# Patient Record
Sex: FEMALE | Race: WHITE | NOT HISPANIC OR LATINO | Employment: UNEMPLOYED | URBAN - METROPOLITAN AREA
[De-identification: names, ages, dates, MRNs, and addresses within clinical notes are randomized per-mention and may not be internally consistent; named-entity substitution may affect disease eponyms.]

---

## 2020-10-27 ENCOUNTER — OFFICE VISIT (OUTPATIENT)
Dept: FAMILY MEDICINE | Facility: CLINIC | Age: 50
End: 2020-10-27
Payer: COMMERCIAL

## 2020-10-27 VITALS
HEIGHT: 62 IN | SYSTOLIC BLOOD PRESSURE: 136 MMHG | RESPIRATION RATE: 18 BRPM | TEMPERATURE: 98 F | WEIGHT: 180 LBS | BODY MASS INDEX: 33.13 KG/M2 | OXYGEN SATURATION: 95 % | DIASTOLIC BLOOD PRESSURE: 84 MMHG | HEART RATE: 74 BPM

## 2020-10-27 DIAGNOSIS — M54.2 CHRONIC NECK PAIN: ICD-10-CM

## 2020-10-27 DIAGNOSIS — K21.9 GASTROESOPHAGEAL REFLUX DISEASE, UNSPECIFIED WHETHER ESOPHAGITIS PRESENT: ICD-10-CM

## 2020-10-27 DIAGNOSIS — K31.84 GASTROPARESIS: ICD-10-CM

## 2020-10-27 DIAGNOSIS — M54.12 CERVICAL RADICULOPATHY: ICD-10-CM

## 2020-10-27 DIAGNOSIS — R73.03 PREDIABETES: ICD-10-CM

## 2020-10-27 DIAGNOSIS — Z00.00 PREVENTATIVE HEALTH CARE: ICD-10-CM

## 2020-10-27 DIAGNOSIS — E78.2 MIXED HYPERLIPIDEMIA: ICD-10-CM

## 2020-10-27 DIAGNOSIS — G89.29 CHRONIC NECK PAIN: ICD-10-CM

## 2020-10-27 DIAGNOSIS — R03.0 ELEVATED BP WITHOUT DIAGNOSIS OF HYPERTENSION: ICD-10-CM

## 2020-10-27 DIAGNOSIS — M54.32 SCIATICA OF LEFT SIDE: ICD-10-CM

## 2020-10-27 DIAGNOSIS — G89.29 CHRONIC BILATERAL LOW BACK PAIN WITH LEFT-SIDED SCIATICA: ICD-10-CM

## 2020-10-27 DIAGNOSIS — E03.8 HYPOTHYROIDISM DUE TO HASHIMOTO'S THYROIDITIS: Primary | ICD-10-CM

## 2020-10-27 DIAGNOSIS — M54.42 CHRONIC BILATERAL LOW BACK PAIN WITH LEFT-SIDED SCIATICA: ICD-10-CM

## 2020-10-27 DIAGNOSIS — E06.3 HYPOTHYROIDISM DUE TO HASHIMOTO'S THYROIDITIS: Primary | ICD-10-CM

## 2020-10-27 PROCEDURE — 99999 PR PBB SHADOW E&M-NEW PATIENT-LVL V: CPT | Mod: PBBFAC,,, | Performed by: INTERNAL MEDICINE

## 2020-10-27 PROCEDURE — 99204 OFFICE O/P NEW MOD 45 MIN: CPT | Mod: 25,S$GLB,, | Performed by: INTERNAL MEDICINE

## 2020-10-27 PROCEDURE — 3008F PR BODY MASS INDEX (BMI) DOCUMENTED: ICD-10-PCS | Mod: CPTII,S$GLB,, | Performed by: INTERNAL MEDICINE

## 2020-10-27 PROCEDURE — 90686 FLU VACCINE (QUAD) GREATER THAN OR EQUAL TO 3YO PRESERVATIVE FREE IM: ICD-10-PCS | Mod: S$GLB,,, | Performed by: INTERNAL MEDICINE

## 2020-10-27 PROCEDURE — 99999 PR PBB SHADOW E&M-NEW PATIENT-LVL V: ICD-10-PCS | Mod: PBBFAC,,, | Performed by: INTERNAL MEDICINE

## 2020-10-27 PROCEDURE — 3008F BODY MASS INDEX DOCD: CPT | Mod: CPTII,S$GLB,, | Performed by: INTERNAL MEDICINE

## 2020-10-27 PROCEDURE — 99204 PR OFFICE/OUTPT VISIT, NEW, LEVL IV, 45-59 MIN: ICD-10-PCS | Mod: 25,S$GLB,, | Performed by: INTERNAL MEDICINE

## 2020-10-27 PROCEDURE — 90471 FLU VACCINE (QUAD) GREATER THAN OR EQUAL TO 3YO PRESERVATIVE FREE IM: ICD-10-PCS | Mod: S$GLB,,, | Performed by: INTERNAL MEDICINE

## 2020-10-27 PROCEDURE — 90686 IIV4 VACC NO PRSV 0.5 ML IM: CPT | Mod: S$GLB,,, | Performed by: INTERNAL MEDICINE

## 2020-10-27 PROCEDURE — 90471 IMMUNIZATION ADMIN: CPT | Mod: S$GLB,,, | Performed by: INTERNAL MEDICINE

## 2020-10-27 RX ORDER — ROSUVASTATIN CALCIUM 10 MG/1
TABLET, COATED ORAL
COMMUNITY
End: 2020-11-30 | Stop reason: SDUPTHER

## 2020-10-27 RX ORDER — PANTOPRAZOLE SODIUM 40 MG/1
TABLET, DELAYED RELEASE ORAL
COMMUNITY

## 2020-10-27 RX ORDER — LEVOTHYROXINE SODIUM 88 UG/1
TABLET ORAL
COMMUNITY

## 2020-10-27 RX ORDER — GABAPENTIN 300 MG/1
CAPSULE ORAL
COMMUNITY

## 2020-10-27 RX ORDER — BETHANECHOL CHLORIDE 25 MG/1
TABLET ORAL
COMMUNITY

## 2020-10-27 RX ORDER — CYCLOBENZAPRINE HCL 5 MG
TABLET ORAL
COMMUNITY
End: 2020-11-02 | Stop reason: SDUPTHER

## 2020-10-27 NOTE — PROGRESS NOTES
Assessment and Plan:    1. Hypothyroidism due to Hashimoto's thyroiditis  - TSH; Future  - Ambulatory referral/consult to Endocrinology; Future  2. Prediabetes  - Hemoglobin A1C; Future  - Ambulatory referral/consult to Endocrinology; Future  Discussed options with patient of me monitoring hypothyroidism and prediabetes, states she would prefer to establish with an Endocrinologist. Not on metformin 2/2 gastroparesis. Stable dose of levothyroxine for years per her report.    3. Mixed hyperlipidemia  Continue crestor  - Lipid Panel; Future    4. Elevated BP without diagnosis of hypertension  No prior diagnosis of HTN. Advised to monitor at home and keep BP log.  - Comprehensive Metabolic Panel; Future    5. Chronic neck pain  - Ambulatory referral/consult to Back [facility] Spine Clinic; Future  6. Cervical radiculopathy  - Ambulatory referral/consult to Back [facility] Spine Clinic; Future  7. Chronic bilateral low back pain with left-sided sciatica  - Ambulatory referral/consult to Back & Spine Clinic; Future  8. Sciatica of left side  Has been seeing Neurosurgeon regularly in [place] and has had multiple cervical spinal fusions. Symptoms are not new. Will establish care here for ongoing evaluation and management.    9. Gastroesophageal reflux disease, unspecified whether esophagitis present  Continue protonix. Establish care with GI as below.   - Ambulatory referral/consult to Gastroenterology; Future    10. Gastroparesis  States she has been using bethanechol for gastroparesis per he GI doctor and that this has been working. Will establish with GI here.  - Ambulatory referral/consult to Gastroenterology; Future    11. Preventative health care  [facility] for mammogram [facility]  Flu shot given today  Tdap ordered, but clinic out of stock  - Comprehensive Metabolic Panel; Future  - CBC auto differential; Future  - Lipid Panel; Future  - TSH; Future  - Hemoglobin A1C; Future  - HIV 1/2 Ag/Ab (4th Gen); Future  - Hepatitis C  Antibody; Future  - Tdap Vaccine      [facility] for mammogram [facility] [place]    ______________________________________________________________________  Subjective:    Chief Complaint:  Establish care    HPI:  Dulce is a 50 y.o. year old woman here to establish care    Hypothyroidism- Takes 88 mcg daily, takes this correctly.     She has chronic neck pain. She had a history of C2-3-4 fusion. Had a repeat surgery in March of this year, thinks that she had C4-T1 fused. Takes gabapentin 300 mg TID for numbness and pain in her left hand. Notes that the numbness had been present even before the surgery. She also takes flexeril PRN, usually taking this once per day as needed. She reports that she has also been having pain in her low back for more than a year and had been planning to have this evaluated in [place] before she moved here. Having some left sciatica as well now.     She has a history of gastroparesis. She had been prescribed bethanechol which she takes PRN before a heavy meal per her gastroenterologist. She also takes protonix for GERD per GI.     She has had injections for hip bursitis in the past.     She takes crestor 10 for HLD. Has been on this for about a year.     She has been told intermittently that her BP was high, but has not ever been on medication for this.     Past Medical History:  Past Medical History:   Diagnosis Date    Cervical radiculopathy     Chronic low back pain     Chronic neck pain     Gastroparesis     GERD (gastroesophageal reflux disease)     Hip bursitis     History of gestational diabetes     HLD (hyperlipidemia)     Hypothyroidism     Sciatica of left side        Past Surgical History:  Past Surgical History:   Procedure Laterality Date    CERVICAL FUSION      x2     SECTION      HYSTERECTOMY      SALPINGECTOMY      SHOULDER SURGERY Right        Family History:  Family History   Problem Relation Age of Onset    Diabetes Mother     Hypertension  Mother     Diabetes Father     Hypertension Father        Social History:  Social History     Socioeconomic History    Marital status:      Spouse name: Not on file    Number of children: Not on file    Years of education: Not on file    Highest education level: Not on file   Occupational History    Not on file   Social Needs    Financial resource strain: Not on file    Food insecurity     Worry: Not on file     Inability: Not on file    Transportation needs     Medical: Not on file     Non-medical: Not on file   Tobacco Use    Smoking status: Former Smoker     Types: Cigarettes    Smokeless tobacco: Never Used    Tobacco comment: Quit in 2009   Substance and Sexual Activity    Alcohol use: Yes     Frequency: Monthly or less     Drinks per session: 1 or 2     Binge frequency: Never    Drug use: Never    Sexual activity: Yes     Partners: Male     Birth control/protection: See Surgical Hx   Lifestyle    Physical activity     Days per week: Not on file     Minutes per session: Not on file    Stress: Not on file   Relationships    Social connections     Talks on phone: Not on file     Gets together: Not on file     Attends Confucianist service: Not on file     Active member of club or organization: Not on file     Attends meetings of clubs or organizations: Not on file     Relationship status: Not on file   Other Topics Concern    Not on file   Social History Narrative    Not currently working, recently moved from [place]       Medications:  Current Outpatient Medications on File Prior to Visit   Medication Sig Dispense Refill    bethanechol (URECHOLINE) 25 MG Tab bethanechol chloride 25 mg tablet   Take 1 tablet 4 times a day by oral route.      cyclobenzaprine (FLEXERIL) 5 MG tablet cyclobenzaprine 5 mg tablet   1 po prn      gabapentin (NEURONTIN) 300 MG capsule gabapentin 300 mg capsule   Take 1 capsule 3 times a day by oral route.      levothyroxine (SYNTHROID) 88 MCG tablet levothyroxine  "88 mcg tablet   1 TAB PO QAM on empty stomach   No food/drink/med x 1 hour; no vitamins x 4 hours.      pantoprazole (PROTONIX) 40 MG tablet pantoprazole 40 mg tablet,delayed release   Take 1 tablet every day by oral route.      rosuvastatin (CRESTOR) 10 MG tablet rosuvastatin 10 mg tablet   TAKE 1 TABLET BY MOUTH EVERY DAY       No current facility-administered medications on file prior to visit.        Allergies:  Clarithromycin and Penicillins    Immunizations:  Immunization History   Administered Date(s) Administered    Influenza - Quadrivalent 11/01/2018, 10/01/2019    Influenza - Quadrivalent - PF *Preferred* (6 months and older) 10/27/2020       Review of Systems:  Review of Systems   Constitutional: Negative for chills and fever.   HENT: Negative for congestion and trouble swallowing.    Eyes: Negative for pain and visual disturbance.   Respiratory: Negative for shortness of breath and wheezing.    Cardiovascular: Negative for chest pain and leg swelling.   Gastrointestinal: Negative for abdominal pain, constipation and diarrhea.   Endocrine: Negative for cold intolerance and heat intolerance.   Genitourinary: Negative for difficulty urinating and hematuria.   Musculoskeletal: Positive for back pain, neck pain and neck stiffness. Negative for gait problem and myalgias.   Skin: Negative for rash and wound.   Allergic/Immunologic: Negative for environmental allergies and food allergies.   Neurological: Positive for weakness and numbness. Negative for seizures and syncope.   Psychiatric/Behavioral: Negative for dysphoric mood. The patient is not nervous/anxious.        Objective:    Vitals:  Vitals:    10/27/20 1124 10/27/20 1207   BP: (!) 148/88 136/84   Pulse: 74    Resp: 18    Temp: 98.1 °F (36.7 °C)    TempSrc: Temporal    SpO2: 95%    Weight: 81.7 kg (180 lb 0.1 oz)    Height: 5' 2" (1.575 m)    PainSc:   4    PainLoc: Hand        Physical Exam  Vitals signs reviewed.   Constitutional:       General: " She is not in acute distress.     Appearance: She is well-developed. She is not diaphoretic.   HENT:      Mouth/Throat:      Pharynx: No oropharyngeal exudate.   Eyes:      General: No scleral icterus.        Right eye: No discharge.         Left eye: No discharge.      Conjunctiva/sclera: Conjunctivae normal.   Neck:      Musculoskeletal: Neck supple.      Thyroid: No thyromegaly.      Trachea: No tracheal deviation.      Comments: limited ROM in all directions  Cardiovascular:      Rate and Rhythm: Normal rate and regular rhythm.      Heart sounds: Normal heart sounds. No murmur.   Pulmonary:      Effort: Pulmonary effort is normal. No respiratory distress.      Breath sounds: Normal breath sounds. No wheezing or rales.   Abdominal:      General: Bowel sounds are normal. There is no distension.      Palpations: Abdomen is soft.      Tenderness: There is no abdominal tenderness.      Hernia: No hernia is present.   Musculoskeletal:      Right lower leg: No edema.      Left lower leg: No edema.   Lymphadenopathy:      Cervical: No cervical adenopathy.   Skin:     General: Skin is warm and dry.   Neurological:      Mental Status: She is alert and oriented to person, place, and time.   Psychiatric:         Behavior: Behavior normal.         Judgment: Judgment normal.         Body mass index is 32.92 kg/m².      Data:  Previous outside labs reviewed and pertinent for A1c 6.3.        Delia Nascimento MD  Internal Medicine

## 2020-10-28 DIAGNOSIS — Z12.31 OTHER SCREENING MAMMOGRAM: ICD-10-CM

## 2020-11-02 ENCOUNTER — OFFICE VISIT (OUTPATIENT)
Dept: SPINE | Facility: CLINIC | Age: 50
End: 2020-11-02
Payer: COMMERCIAL

## 2020-11-02 VITALS
WEIGHT: 180 LBS | DIASTOLIC BLOOD PRESSURE: 90 MMHG | HEART RATE: 81 BPM | SYSTOLIC BLOOD PRESSURE: 148 MMHG | HEIGHT: 62 IN | BODY MASS INDEX: 33.13 KG/M2

## 2020-11-02 DIAGNOSIS — M54.12 CERVICAL RADICULOPATHY: ICD-10-CM

## 2020-11-02 DIAGNOSIS — M54.42 CHRONIC BILATERAL LOW BACK PAIN WITH LEFT-SIDED SCIATICA: ICD-10-CM

## 2020-11-02 DIAGNOSIS — G89.29 CHRONIC NECK PAIN: ICD-10-CM

## 2020-11-02 DIAGNOSIS — G89.29 CHRONIC BILATERAL LOW BACK PAIN WITH LEFT-SIDED SCIATICA: ICD-10-CM

## 2020-11-02 DIAGNOSIS — M54.2 CHRONIC NECK PAIN: ICD-10-CM

## 2020-11-02 DIAGNOSIS — Z98.1 HISTORY OF FUSION OF CERVICAL SPINE: Primary | ICD-10-CM

## 2020-11-02 PROCEDURE — 99203 OFFICE O/P NEW LOW 30 MIN: CPT | Mod: S$GLB,,, | Performed by: PHYSICIAN ASSISTANT

## 2020-11-02 PROCEDURE — 99203 PR OFFICE/OUTPT VISIT, NEW, LEVL III, 30-44 MIN: ICD-10-PCS | Mod: S$GLB,,, | Performed by: PHYSICIAN ASSISTANT

## 2020-11-02 PROCEDURE — 99999 PR PBB SHADOW E&M-EST. PATIENT-LVL V: ICD-10-PCS | Mod: PBBFAC,,, | Performed by: PHYSICIAN ASSISTANT

## 2020-11-02 PROCEDURE — 99999 PR PBB SHADOW E&M-EST. PATIENT-LVL V: CPT | Mod: PBBFAC,,, | Performed by: PHYSICIAN ASSISTANT

## 2020-11-02 RX ORDER — DIAZEPAM 10 MG/1
10 TABLET ORAL ONCE
Qty: 1 TABLET | Refills: 0 | Status: SHIPPED | OUTPATIENT
Start: 2020-11-02 | End: 2020-11-23 | Stop reason: SDUPTHER

## 2020-11-02 RX ORDER — CYCLOBENZAPRINE HCL 5 MG
5 TABLET ORAL EVERY 12 HOURS PRN
Qty: 40 TABLET | Refills: 0 | Status: SHIPPED | OUTPATIENT
Start: 2020-11-02 | End: 2021-03-24 | Stop reason: SDUPTHER

## 2020-11-02 NOTE — PROGRESS NOTES
Back and Spine Consult    Patient ID: Dulce Mobley is a 50 y.o. female.    Chief Complaint   Patient presents with    Neck Pain     She has chronic neck pain. She had neck surgery 3/2020 in [place], [place]. She has numbness & pain in her left hand, weakness causes her to drop things.    Low-back Pain     She has had chronic low back pain & pain radiates down the left leg to the left shin. Pain started 8-9 months ago. Sitting or walking to long makes pain worse.       Review of Systems   Constitutional: Negative for activity change, appetite change, chills, fever and unexpected weight change.   HENT: Negative for tinnitus, trouble swallowing and voice change.    Respiratory: Negative for apnea, cough, chest tightness and shortness of breath.    Cardiovascular: Negative for chest pain and palpitations.   Gastrointestinal: Negative for constipation, diarrhea, nausea and vomiting.   Genitourinary: Negative for difficulty urinating, dysuria, frequency and urgency.   Musculoskeletal: Positive for back pain, myalgias and neck pain. Negative for gait problem and neck stiffness.   Skin: Negative for wound.   Neurological: Positive for weakness and numbness. Negative for dizziness, tremors, seizures, facial asymmetry, speech difficulty, light-headedness and headaches.   Psychiatric/Behavioral: Negative for confusion and decreased concentration.       Past Medical History:   Diagnosis Date    Cervical radiculopathy     Chronic low back pain     Chronic neck pain     Gastroparesis     GERD (gastroesophageal reflux disease)     Hip bursitis     History of gestational diabetes     HLD (hyperlipidemia)     Hypothyroidism     Sciatica of left side      Social History     Socioeconomic History    Marital status:      Spouse name: Not on file    Number of children: Not on file    Years of education: Not on file    Highest education level: Not on file   Occupational History    Not on file   Social Needs     Financial resource strain: Not on file    Food insecurity     Worry: Not on file     Inability: Not on file    Transportation needs     Medical: Not on file     Non-medical: Not on file   Tobacco Use    Smoking status: Former Smoker     Types: Cigarettes    Smokeless tobacco: Never Used    Tobacco comment: Quit in 2009   Substance and Sexual Activity    Alcohol use: Yes     Frequency: Monthly or less     Drinks per session: 1 or 2     Binge frequency: Never    Drug use: Never    Sexual activity: Yes     Partners: Male     Birth control/protection: See Surgical Hx   Lifestyle    Physical activity     Days per week: Not on file     Minutes per session: Not on file    Stress: Not on file   Relationships    Social connections     Talks on phone: Not on file     Gets together: Not on file     Attends Advent service: Not on file     Active member of club or organization: Not on file     Attends meetings of clubs or organizations: Not on file     Relationship status: Not on file   Other Topics Concern    Not on file   Social History Narrative    Not currently working, recently moved from [place]     Family History   Problem Relation Age of Onset    Diabetes Mother     Hypertension Mother     Diabetes Father     Hypertension Father      Review of patient's allergies indicates:   Allergen Reactions    Clarithromycin Rash    Penicillins Rash       Current Outpatient Medications:     bethanechol (URECHOLINE) 25 MG Tab, bethanechol chloride 25 mg tablet  Take 1 tablet 4 times a day by oral route., Disp: , Rfl:     cyclobenzaprine (FLEXERIL) 5 MG tablet, Take 1 tablet (5 mg total) by mouth every 12 (twelve) hours as needed for Muscle spasms., Disp: 40 tablet, Rfl: 0    diazePAM (VALIUM) 10 MG Tab, Take 1 tablet (10 mg total) by mouth once. 30 minutes prior to MRI. for 1 dose, Disp: 1 tablet, Rfl: 0    gabapentin (NEURONTIN) 300 MG capsule, gabapentin 300 mg capsule  Take 1 capsule 3 times a day by oral  "route., Disp: , Rfl:     levothyroxine (SYNTHROID) 88 MCG tablet, levothyroxine 88 mcg tablet  1 TAB PO QAM on empty stomach  No food/drink/med x 1 hour; no vitamins x 4 hours., Disp: , Rfl:     pantoprazole (PROTONIX) 40 MG tablet, pantoprazole 40 mg tablet,delayed release  Take 1 tablet every day by oral route., Disp: , Rfl:     rosuvastatin (CRESTOR) 10 MG tablet, rosuvastatin 10 mg tablet  TAKE 1 TABLET BY MOUTH EVERY DAY, Disp: , Rfl:     Vitals:    11/02/20 1324   BP: (!) 148/90   BP Location: Right arm   Patient Position: Sitting   BP Method: Medium (Automatic)   Pulse: 81   Weight: 81.7 kg (180 lb 0.1 oz)   Height: 5' 2" (1.575 m)       Physical Exam  Vitals signs and nursing note reviewed.   Constitutional:       Appearance: She is well-developed.   HENT:      Head: Normocephalic and atraumatic.   Eyes:      Pupils: Pupils are equal, round, and reactive to light.   Neck:      Musculoskeletal: Normal range of motion and neck supple.   Cardiovascular:      Rate and Rhythm: Normal rate.   Pulmonary:      Effort: Pulmonary effort is normal.   Musculoskeletal: Normal range of motion.   Skin:     General: Skin is warm and dry.   Neurological:      Mental Status: She is alert and oriented to person, place, and time.      Coordination: Finger-Nose-Finger Test, Heel to Shin Test and Romberg Test normal.      Gait: Gait is intact. Tandem walk normal.      Deep Tendon Reflexes:      Reflex Scores:       Tricep reflexes are 2+ on the right side and 2+ on the left side.       Bicep reflexes are 2+ on the right side and 2+ on the left side.       Brachioradialis reflexes are 2+ on the right side and 2+ on the left side.       Patellar reflexes are 2+ on the right side and 2+ on the left side.       Achilles reflexes are 2+ on the right side and 2+ on the left side.  Psychiatric:         Speech: Speech normal.         Behavior: Behavior normal.         Thought Content: Thought content normal.         Judgment: Judgment " normal.         Neurologic Exam     Mental Status   Oriented to person, place, and time.   Oriented to person.   Oriented to place.   Oriented to time.   Follows 3 step commands.   Attention: normal. Concentration: normal.   Speech: speech is normal   Level of consciousness: alert  Knowledge: consistent with education.   Able to name object. Able to read. Able to repeat. Able to write. Normal comprehension.     Cranial Nerves     CN II   Visual acuity: normal  Right visual field deficit: none  Left visual field deficit: none     CN III, IV, VI   Pupils are equal, round, and reactive to light.  Right pupil: Size: 3 mm. Shape: regular. Reactivity: brisk. Consensual response: intact.   Left pupil: Size: 3 mm. Shape: regular. Reactivity: brisk. Consensual response: intact.   CN III: no CN III palsy  CN VI: no CN VI palsy  Nystagmus: none   Diplopia: none  Ophthalmoparesis: none  Conjugate gaze: present    CN V   Right facial sensation deficit: none  Left facial sensation deficit: none    CN VII   Right facial weakness: none  Left facial weakness: none    CN VIII   Hearing: intact    CN IX, X   CN IX normal.   CN X normal.     CN XI   Right sternocleidomastoid strength: normal  Left sternocleidomastoid strength: normal  Right trapezius strength: normal  Left trapezius strength: normal    CN XII   Fasciculations: absent  Tongue deviation: none    Motor Exam   Muscle bulk: normal  Overall muscle tone: normal  Right arm pronator drift: absent  Left arm pronator drift: absent    Strength   Right neck flexion: 5/5  Left neck flexion: 5/5  Right neck extension: 5/5  Left neck extension: 5/5  Right deltoid: 5/5  Left deltoid: 5/5  Right biceps: 5/5  Left biceps: 5/5  Right triceps: 5/5  Left triceps: 5/5  Right wrist flexion: 5/5  Left wrist flexion: 5/5  Right wrist extension: 5/5  Left wrist extension: 5/5  Right interossei: 5/5  Left interossei: 5/5  Right abdominals: 5/5  Left abdominals: 5/5  Right iliopsoas: 5/5  Left  iliopsoas: 5/5  Right quadriceps: 5/5  Left quadriceps: 5/5  Right hamstrin/5  Left hamstrin/5  Right glutei: 5/5  Left glutei: 5/5  Right anterior tibial: 5/5  Left anterior tibial: 5/5  Right posterior tibial: 5/5  Left posterior tibial: 5/5  Right peroneal: 5/5  Left peroneal: 5/5  Right gastroc: 5/5  Left gastroc: 5/5    Sensory Exam   Right arm light touch: normal  Left arm light touch: normal  Right leg light touch: normal  Left leg light touch: normal  Right arm vibration: normal  Left arm vibration: normal  Right arm pinprick: normal  Left arm pinprick: normal    Gait, Coordination, and Reflexes     Gait  Gait: normal    Coordination   Romberg: negative  Finger to nose coordination: normal  Heel to shin coordination: normal  Tandem walking coordination: normal    Tremor   Resting tremor: absent  Intention tremor: absent  Action tremor: absent    Reflexes   Right brachioradialis: 2+  Left brachioradialis: 2+  Right biceps: 2+  Left biceps: 2+  Right triceps: 2+  Left triceps: 2+  Right patellar: 2+  Left patellar: 2+  Right achilles: 2+  Left achilles: 2+  Right Boggs: absent  Left Boggs: absent  Right ankle clonus: absent  Left ankle clonus: absent      Provider dictation:    50 year old female former smoker with chronic pain, GERD and hyperlipidemia is referred by Dr. Nascimento for evaluation of neck and back pain.      She has had 2 cervical spine surgeries - 2007 and 2020 ([facility]).  She underwent 2 months of PT after her last surgery, but continued to have pain.  She has pain in the posterior neck.  It is associated with numbness/ pain from the left elbow to the hand affecting the 4th and 5th digits, but now also the 3rd digit.  Pain increases with touching the hand.      She has had lower back pain for 8-9 months.  It is felt in the left side of the lower lumbar region with radiation into the left anterolateral thigh to the shin.  Pain increases with sitting/ walking.  She  denies numbness and tingling.      She is taking flexeril and gabapentin for pain control.  She underwent PT for the neck in May 2020 and has not had lumbar PT.  She has not had SHANTELLE.    NDI:  26%.  Oswestry score: 26%.  PHQ:  15.    On exam, she has 5/5 strength with 2+ DTR and no sensory deficits.  She had anterior and posterior neck surgical scars.  Gait and station fluid.  Denies bowel/ bladder dysfunction.  Negative herrera.    No imaging.    IN conclusion, Ms. Cardona has had prior anterior and posterior cervical spine fusion with continued pain and left arm radiculopathy vs peripheral neuropathy.  To further assess and determine next steps in treatment, we will obtain surgical records, xray cervical spine, MRI cervical spine and emg/ NCV of the upper extremities.  I recommend PT to start helping with neck pain.  She requests valium for MRI anxiety/ claustrophobia.  I am prescrbing zanaflex to help with spasms/ pain.  Regarding the lower back, she could have myofasical pain vs neural irritation.  She has no neurological deficits; therefore I recommend starting with lumbar xrays.  PT will address the lower back as well as the neck.  Follow up in clinc after imaging.    Addendum:  Received records from [facility], [place]  3-9-2020 Posterior decompression C5/6, C6/7 C7/T1 with spinal cord decompression; posterior fusion C3/4, C5/6, C6/7, C7/T1 by Dr. Manuel Rock      Visit Diagnosis:  History of fusion of cervical spine  -     diazePAM (VALIUM) 10 MG Tab; Take 1 tablet (10 mg total) by mouth once. 30 minutes prior to MRI. for 1 dose  Dispense: 1 tablet; Refill: 0  -     cyclobenzaprine (FLEXERIL) 5 MG tablet; Take 1 tablet (5 mg total) by mouth every 12 (twelve) hours as needed for Muscle spasms.  Dispense: 40 tablet; Refill: 0  -     X-Ray Cervical Spine 5 View W Flex Extxt; Future; Expected date: 11/02/2020  -     MRI Cervical Spine Without Contrast; Future; Expected date: 11/02/2020  -     EMG  W/ ULTRASOUND AND NERVE CONDUCTION TEST 2 Extremities; Future    Chronic neck pain  -     Ambulatory referral/consult to Back & Spine Clinic  -     diazePAM (VALIUM) 10 MG Tab; Take 1 tablet (10 mg total) by mouth once. 30 minutes prior to MRI. for 1 dose  Dispense: 1 tablet; Refill: 0  -     cyclobenzaprine (FLEXERIL) 5 MG tablet; Take 1 tablet (5 mg total) by mouth every 12 (twelve) hours as needed for Muscle spasms.  Dispense: 40 tablet; Refill: 0  -     X-Ray Cervical Spine 5 View W Flex Extxt; Future; Expected date: 11/02/2020  -     MRI Cervical Spine Without Contrast; Future; Expected date: 11/02/2020  -     EMG W/ ULTRASOUND AND NERVE CONDUCTION TEST 2 Extremities; Future  -     Ambulatory referral/consult to Physical/Occupational Therapy; Future; Expected date: 11/02/2020    Cervical radiculopathy  -     Ambulatory referral/consult to [facility] Spine Clinic  -     diazePAM (VALIUM) 10 MG Tab; Take 1 tablet (10 mg total) by mouth once. 30 minutes prior to MRI. for 1 dose  Dispense: 1 tablet; Refill: 0  -     cyclobenzaprine (FLEXERIL) 5 MG tablet; Take 1 tablet (5 mg total) by mouth every 12 (twelve) hours as needed for Muscle spasms.  Dispense: 40 tablet; Refill: 0  -     X-Ray Cervical Spine 5 View W Flex Extxt; Future; Expected date: 11/02/2020  -     MRI Cervical Spine Without Contrast; Future; Expected date: 11/02/2020  -     EMG W/ ULTRASOUND AND NERVE CONDUCTION TEST 2 Extremities; Future  -     Ambulatory referral/consult to Physical/Occupational Therapy; Future; Expected date: 11/02/2020    Chronic bilateral low back pain with left-sided sciatica  -     Ambulatory referral/consult to Back & Spine Clinic  -     cyclobenzaprine (FLEXERIL) 5 MG tablet; Take 1 tablet (5 mg total) by mouth every 12 (twelve) hours as needed for Muscle spasms.  Dispense: 40 tablet; Refill: 0  -     X-Ray Lumbar Complete With Flex And Ext; Future; Expected date: 11/02/2020  -     Ambulatory referral/consult to  Physical/Occupational Therapy; Future; Expected date: 11/02/2020        Total time spent counseling greater than fifty percent of total visit time.  Counseling included discussion regarding imaging findings, diagnosis possibilities, treatment options, risks and benefits.   The patient had many questions regarding the options and long-term effects.

## 2020-11-02 NOTE — PROGRESS NOTES
Hello! We are asking you to complete following questionnaire prior to your upcoming virtual visit with Daisy Pettit. This questionnaire has been designed to give us information on how your low back or leg pain has affected your ability to manage everyday life. Please respond with only ONE number answer to each question which best applies to you TODAY. This will need to be completed and sent back prior to checking in for your appointment.    EXAMPLE OF RESPONSE:  Q1: 2       Q2: 1      Q3: 4     Q1: Pain Intensity 2  0 - No pain  1 - Very Mild pain   2 - Moderate Pain  3 - Fairly Severe Pain  4 - Very Severe Pain  5 - Worst Imaginable Pain    Q2: Personal Care (washing, dressing, etc) 0  0 - I can look after myself normally without causing extra pain  1 - I can look after myself normally but it causes extra pain  2 - It is painful to look after myself and I am slow and careful  3 - I need some help but manage most of my personal care   4 - I need help everyday in most aspects of self-care   5 - I do not get dressed, I wash with difficulty and stay in bed    Q3: Lifting 1  0 - I can lift heavy weights without extra pain  1 - I can lift heavy weights but it gives extra pain   2 - Pain prevents me from lifting heavy weights off the floor, but I can manage if they are conveniently placed (eg. on a table)  3 - Pain prevents me from lifting heavy weights, but I can manage light to medium weights if they are conveniently positioned   4 - I can lift very light weights   5 - I cannot lift or carry anything at all     Q4: Walking 1  0 - Pain does not prevent me walking any distance   1 - Pain prevents me from walking more than 1 mile  2 - Pain prevents me from walking more than 1/2 mile  3 - Pain prevents me from walking more than 100 yards  4 - I can only walk using a stick or crutches  5 - I am in bed most of the time    Q5: Sitting 4  0 - I can sit in any chair as long as I like   1 - I can only sit in my favorite chair  as long as I like   2 - Pain prevents me from sitting for more than one hour   3 - Pain prevents me from sitting for more than 30 minutes   4 - Pain prevents me from sitting for more than 10 minutes   5 - Pain prevents me from sitting at all     Q6: Standing 2  0 - I can stand as long as I want without extra pain  1 - I can stand as long as I want but it gives me extra pain  2 - Pain prevents me from standing for more than 1 hour   3 - Pain prevents me from standing for more than 30 minutes   4 - Pain prevents me from standing for more than 10 minutes   5 - Pain prevents me from standing at all     Q7: Sleeping 2  0 - My sleep is never disturbed by pain   1 - My sleep is occasionally disturbed by pain   2 - Because of pain, I have less than 6 hours of sleep  3 - Because of pain, I have less than 4 hours of sleep  4 - Because of pain, I have less than 2 hours of sleep  5 - Pain prevents me from sleeping at all    Q8: Sex Life (if applicable) 0  0 - My sex life is normal and causes no extra pain  1 - My sex life is normal but causes some extra pain   2 - My sex life is nearly normal but is very painful   3 - My sex life is severely restricted by pain  4 - My sex life is nearly absent because of pain  5 - Pain prevents any sex life at all    Q9: Social Life 0  0 - My social life is normal and gives me no extra pain  1 - My social life is normal but increases the degree of pain  2 - Pain has no significant effect on my social life apart from limiting my more energetic interests such as sports   3 - Pain has restricted my social life and I do not go out as often   4 - Pain has restricted my social life to my house  5 - I have no social life because of pain    Q10: Traveling 1   0 - I can travel anywhere without pain  1 - I can travel anywhere but it gives me extra pain   2 - Pain is bad but I manage journeys over 2 hours   3 - Pain restricts me to journeys of less than 1 hour  4 - Pain restricts me to short necessary  journeys under 30 minutes   5 - Pain prevents me from traveling except to receive treatment

## 2020-11-02 NOTE — LETTER
November 5, 2020      Delia Nascimento MD  [address]  [facility] [address]           [facility]  [address] [place] [address]  Phone: [phone]  Fax: [phone]          Patient: Dulce Mobley   MR Number: [id number]   YOB: 1970   Date of Visit: 11/2/2020       Dear Dr. Delia Nascimento:    Thank you for referring Dulce Mobley to me for evaluation. Attached you will find relevant portions of my assessment and plan of care.    If you have questions, please do not hesitate to call me. I look forward to following Dulce Mobley along with you.    Sincerely,    Daisy Pettit PA-C    Enclosure  CC:  No Recipients    If you would like to receive this communication electronically, please contact [email or web address] or [phone] to request more information on [employer] Link access.    For providers and/or their staff who would like to refer a patient to [facility], please contact us through our one-stop-shop provider referral line, [facility], at [phone].    If you feel you have received this communication in error or would no longer like to receive these types of communications, please e-mail [email or web address]

## 2020-11-04 ENCOUNTER — OFFICE VISIT (OUTPATIENT)
Dept: GASTROENTEROLOGY | Facility: CLINIC | Age: 50
End: 2020-11-04
Payer: COMMERCIAL

## 2020-11-04 VITALS — BODY MASS INDEX: 33.1 KG/M2 | WEIGHT: 179.88 LBS | HEIGHT: 62 IN

## 2020-11-04 DIAGNOSIS — K59.09 CHRONIC CONSTIPATION: ICD-10-CM

## 2020-11-04 DIAGNOSIS — R14.0 ABDOMINAL BLOATING: ICD-10-CM

## 2020-11-04 DIAGNOSIS — K21.9 GASTROESOPHAGEAL REFLUX DISEASE, UNSPECIFIED WHETHER ESOPHAGITIS PRESENT: ICD-10-CM

## 2020-11-04 DIAGNOSIS — K31.84 GASTROPARESIS: ICD-10-CM

## 2020-11-04 DIAGNOSIS — R10.13 EPIGASTRIC PAIN: Primary | ICD-10-CM

## 2020-11-04 DIAGNOSIS — R11.0 NAUSEA: ICD-10-CM

## 2020-11-04 DIAGNOSIS — R68.81 EARLY SATIETY: ICD-10-CM

## 2020-11-04 DIAGNOSIS — Z12.11 SCREENING FOR COLON CANCER: ICD-10-CM

## 2020-11-04 PROCEDURE — 99999 PR PBB SHADOW E&M-EST. PATIENT-LVL IV: CPT | Mod: PBBFAC,,, | Performed by: NURSE PRACTITIONER

## 2020-11-04 PROCEDURE — 99204 PR OFFICE/OUTPT VISIT, NEW, LEVL IV, 45-59 MIN: ICD-10-PCS | Mod: S$GLB,,, | Performed by: NURSE PRACTITIONER

## 2020-11-04 PROCEDURE — 99204 OFFICE O/P NEW MOD 45 MIN: CPT | Mod: S$GLB,,, | Performed by: NURSE PRACTITIONER

## 2020-11-04 PROCEDURE — 99999 PR PBB SHADOW E&M-EST. PATIENT-LVL IV: ICD-10-PCS | Mod: PBBFAC,,, | Performed by: NURSE PRACTITIONER

## 2020-11-04 RX ORDER — FAMOTIDINE 40 MG/1
40 TABLET, FILM COATED ORAL NIGHTLY
Qty: 30 TABLET | Refills: 2 | Status: SHIPPED | OUTPATIENT
Start: 2020-11-04 | End: 2021-02-02

## 2020-11-04 RX ORDER — DOCUSATE SODIUM 100 MG/1
100 CAPSULE, LIQUID FILLED ORAL DAILY
COMMUNITY

## 2020-11-04 NOTE — PROGRESS NOTES
"Subjective:       Patient ID: Dulce Mobley is a 50 y.o. female, Body mass index is 32.9 kg/m².    Chief Complaint: Gastroesophageal Reflux      Patient is new to me. Referred by Dr. Nascimento for GERD and gastroparesis.    GI Problem  The primary symptoms include abdominal pain and nausea. Primary symptoms do not include fever, weight loss, fatigue, vomiting, diarrhea, melena, hematemesis, jaundice, hematochezia, dysuria or rash.   The abdominal pain began more than 2 days ago (Chronic; started years ago; describes as discomfort and feeling of fullness). The abdominal pain has been gradually improving (since starting Bethanechol) since its onset. The abdominal pain is located in the epigastric region. The abdominal pain radiates to the RUQ and LUQ. The abdominal pain is relieved by nothing (eating aggravates pain, especially large meals).   Nausea began more than 1 week ago (Chronic; started years ago; intermittent). The nausea is exacerbated by food (denies vomiting).   The illness is also significant for bloating and constipation (Chronic; bowel movements are once per day; taking Colace daily). The illness does not include chills, anorexia, dysphagia or odynophagia. Significant associated medical issues include GERD (chronic; fairly well controlled on Protonix; occ has breakthrough "acid in throat"; worse at night; recent EGD in 8/2020 normal per pt report). Associated medical issues do not include inflammatory bowel disease, gallstones, liver disease, alcohol abuse, PUD, gastric bypass, bowel resection, irritable bowel syndrome, hemorrhoids or diverticulitis. Associated medical issues comments: Hx of gastroparesis; taking Bethanechol 25 mg 2-3 times per day.     Review of Systems   Constitutional: Negative for appetite change, chills, fatigue, fever, unexpected weight change and weight loss.   HENT: Negative for trouble swallowing.    Respiratory: Negative for cough and shortness of breath.    Cardiovascular: " Negative for chest pain.   Gastrointestinal: Positive for abdominal pain, bloating, constipation (Chronic; bowel movements are once per day; taking Colace daily) and nausea. Negative for abdominal distention, anal bleeding, anorexia, blood in stool, diarrhea, dysphagia, hematemesis, hematochezia, jaundice, melena, rectal pain and vomiting.   Genitourinary: Negative for difficulty urinating and dysuria.   Musculoskeletal: Negative for gait problem.   Skin: Negative for rash.   Neurological: Negative for speech difficulty.   Psychiatric/Behavioral: Negative for confusion.       Past Medical History:   Diagnosis Date    Cervical radiculopathy     Chronic low back pain     Chronic neck pain     Gastroparesis     GERD (gastroesophageal reflux disease)     Hip bursitis     History of gestational diabetes     HLD (hyperlipidemia)     Hypothyroidism     Sciatica of left side       Past Surgical History:   Procedure Laterality Date    CERVICAL FUSION      x2     SECTION      COLONOSCOPY  2018    done in [place]; unremarkable per pt report    HYSTERECTOMY      SALPINGECTOMY      SHOULDER SURGERY Right     UPPER GASTROINTESTINAL ENDOSCOPY  2020    done in [place]; unremarkable per pt report    UPPER GASTROINTESTINAL ENDOSCOPY        Family History   Problem Relation Age of Onset    Diabetes Mother     Hypertension Mother     Diabetes Father     Hypertension Father     Colon cancer Neg Hx       Wt Readings from Last 10 Encounters:   20 81.6 kg (179 lb 14.3 oz)   20 81.7 kg (180 lb 0.1 oz)   10/27/20 81.7 kg (180 lb 0.1 oz)               Reviewed prior medical records including endoscopy history (see surgical history).     Objective:      Physical Exam  Constitutional:       General: She is not in acute distress.     Appearance: She is well-developed.   HENT:      Head: Normocephalic.      Right Ear: Hearing normal.      Left Ear: Hearing normal.      Nose: Nose normal.       Mouth/Throat:      Comments: Pt wearing mask due to COVID concerns  Eyes:      General: Lids are normal.      Conjunctiva/sclera: Conjunctivae normal.      Pupils: Pupils are equal, round, and reactive to light.   Neck:      Musculoskeletal: Normal range of motion.      Trachea: Trachea normal.   Cardiovascular:      Rate and Rhythm: Normal rate and regular rhythm.      Heart sounds: Normal heart sounds. No murmur.   Pulmonary:      Effort: Pulmonary effort is normal. No respiratory distress.      Breath sounds: Normal breath sounds. No stridor. No wheezing.   Abdominal:      General: Bowel sounds are normal. There is no distension.      Palpations: Abdomen is soft. There is no mass.      Tenderness: There is abdominal tenderness (mild) in the right upper quadrant, epigastric area and left upper quadrant. There is no guarding or rebound.   Musculoskeletal: Normal range of motion.   Skin:     General: Skin is warm and dry.      Findings: No rash.      Comments: Non jaundiced   Neurological:      Mental Status: She is alert and oriented to person, place, and time.   Psychiatric:         Speech: Speech normal.         Behavior: Behavior normal. Behavior is cooperative.           Assessment:       1. Epigastric pain    2. Abdominal bloating    3. Nausea    4. Early satiety    5. History of gastroparesis    6. Gastroesophageal reflux disease, unspecified whether esophagitis present    7. Chronic constipation    8. Screening for colon cancer           Plan:   Patient agreed to sign medical release form to obtain records from GI doctor in [place] (EGD and colonoscopy reports, Gastric emptying study).    All diagnoses and orders for this visit:    Epigastric pain, Abdominal bloating, Nausea, Early satiety, & History of gastroparesis  - Comprehensive Metabolic Panel; Future; Expected date: 11/04/2020  - Lipase; Future; Expected date: 11/04/2020  - US Abdomen Complete; Future; Expected date: 11/04/2020  - Continue Bethanechol  25 mg QID PRN  - Continue Protonix 40 mg once daily        - Start: famotidine (PEPCID) 40 MG tablet; Take 1 tablet (40 mg total) by mouth nightly.  Dispense: 30 tablet; Refill: 2        - Recommend small frequent meals instead of 3 big meals a day, low fat meals & low residue diet. Avoid: high fiber (insoluble fiber), red meats, dairy, and non-digestible solids (peels, fruit pulp, etc). Avoid NSAIDs and narcotics.        - Follow-up with Dr. Hebert in 4-6 weeks for continued evaluation and management of gastroparesis    Gastroesophageal reflux disease, unspecified whether esophagitis present  - Start: famotidine (PEPCID) 40 MG tablet; Take 1 tablet (40 mg total) by mouth nightly.  Dispense: 30 tablet; Refill: 2  - Continue Protonix 40 mg once daily  - Take PPI in the morning 30 minutes before breakfast  - Recommend to avoid large meals, avoid eating within 3 hours of bedtime, elevate head of bed if nocturnal symptoms are present, smoking cessation (if current smoker), & weight loss (if overweight).   - Recommend minimize/avoid high-fat foods, chocolate, caffeine, citrus, alcohol, & tomato products.  - Advised to avoid/limit use of NSAID's, since they can cause GI upset, bleeding, and/or ulcers. If needed, take with food.     Chronic constipation   - Recommend daily exercise as tolerated, adequate water intake, and high fiber diet.    - Recommend OTC fiber supplement   - Continue OTC stool softener    - Recommend OTC MiraLax once daily (17g PO) as directed    Screening for colon cancer   - Will determine next surveillance colonoscopy when records received     If no improvement in symptoms or symptoms worsen, call/follow-up at clinic or go to ER

## 2020-11-04 NOTE — PATIENT INSTRUCTIONS
Epigastric Pain (Uncertain Cause)     Epigastric pain can be a sign of disease in the upper abdomen. Common causes include:  · Acid reflux (stomach acid flowing up into the esophagus)  · Gastritis (irritation of the stomach lining)  · Peptic Ulcer Disease  · Inflammation of the pancreas  · Gallstone  · Infection in the gallbladder  Pain may be dull or burning. It may spread upward to the chest or to the back. There may be other symptoms such as belching, bloating, cramps or hunger pains. There may be weight loss or poor appetite, nausea or vomiting.  Since the diagnosis of your pain is not certain yet, further tests may sometimes be needed. Sometimes the doctor will treat you for the most likely condition to see if there is improvement before doing further tests.  Home care  Medicines  · Antacids help neutralize the normal acids in your stomach. Examples are Maalox, Mylanta, Rolaids, and Tums. If you dont like the liquid, you can also try a chewable one. You may find one works better than another for you. Overuse can cause diarrhea or constipation.  · Acid blockers (H2 blockers) decrease acid production. Examples are cimetidine (Tagamet), famotidine (Pepcid) and ranitidine (Zantac).  · Acid inhibitors (PPIs) decrease acid production in a different way than the blockers. You may find they work better, but can take a little longer to take effect.  Examples are omeprazole (Prilosec), lansoprazole (Prevacid), pantoprazole (Protonix), rabeprazole (Aciphex), and esomeprazole (Nexium).  · Take an antacid 30-60 minutes after eating and at bedtime, but not at the same time as an acid blocker.  · Try not to take NSAIDs. Aspirin may also cause problems, but if taking it for your heart or other medical reasons, talk to your doctor before stopping it; you do not want to cause a worse problem, like a heart attack or stroke.  Diet  · If certain foods seem to cause your spasm, try to avoid them.   · Eat slowly and chew food well  before swallowing. Symptoms of gastritis can be worsened by certain foods. Limit or avoid fatty, fried, and spicy foods, as well as coffee, chocolate, mint, and foods with high acid content such as tomatoes and citrus fruit and juices (orange, grapefruit, lemon).  · Avoid alcohol, caffeine, and tobacco, which can delay healing and worsen your problem.  · Try eating smaller meals with snacks in between  Follow-up care  Follow up with your healthcare provider or as advised.  When to seek medical advice  Call your healthcare provider right away if any of the following occur:  · Stomach pain worsens or moves to the right lower part of the abdomen  · Chest pain appears, or if it worsens or spreads to the chest, back, neck, shoulder, or arm  · Frequent vomiting (cant keep down liquids)  · Blood in the stool or vomit (red or black color)  · Feeling weak or dizzy, fainting, or having trouble breathing  · Fever of 100.4ºF (38ºC) or higher, or as directed by your healthcare provider  · Abdominal swelling  Date Last Reviewed: 9/25/2015  © 4723-0962 [employer]. [address] [place] [address]. All rights reserved. This information is not intended as a substitute for professional medical care. Always follow your healthcare professional's instructions.

## 2020-11-04 NOTE — LETTER
November 4, 2020      Delia Nascimento MD  [address]  [place] [address]           [facility] [address] [place] [address]  Phone: [phone]          Patient: Dulce Mobley   MR Number: [id number]   YOB: 1970   Date of Visit: 11/4/2020       Dear Dr. Delia Nascimento:    Thank you for referring Dulce Mobley to me for evaluation. Attached you will find relevant portions of my assessment and plan of care.    If you have questions, please do not hesitate to call me. I look forward to following Dulce Mobley along with you.    Sincerely,    Chela Faustin, NP    Enclosure  CC:  No Recipients    If you would like to receive this communication electronically, please contact [email or web address] or [phone] to request more information on [employer] Link access.    For providers and/or their staff who would like to refer a patient to [facility], please contact us through our one-stop-shop provider referral line, [facility], at [phone].    If you feel you have received this communication in error or would no longer like to receive these types of communications, please e-mail [email or web address]

## 2020-11-05 ENCOUNTER — HOSPITAL ENCOUNTER (OUTPATIENT)
Dept: RADIOLOGY | Facility: HOSPITAL | Age: 50
Discharge: HOME | End: 2020-11-05
Attending: PHYSICIAN ASSISTANT
Payer: COMMERCIAL

## 2020-11-05 DIAGNOSIS — G89.29 CHRONIC NECK PAIN: ICD-10-CM

## 2020-11-05 DIAGNOSIS — Z98.1 HISTORY OF FUSION OF CERVICAL SPINE: ICD-10-CM

## 2020-11-05 DIAGNOSIS — M54.2 CHRONIC NECK PAIN: ICD-10-CM

## 2020-11-05 DIAGNOSIS — M54.42 CHRONIC BILATERAL LOW BACK PAIN WITH LEFT-SIDED SCIATICA: ICD-10-CM

## 2020-11-05 DIAGNOSIS — M54.12 CERVICAL RADICULOPATHY: ICD-10-CM

## 2020-11-05 DIAGNOSIS — G89.29 CHRONIC BILATERAL LOW BACK PAIN WITH LEFT-SIDED SCIATICA: ICD-10-CM

## 2020-11-05 PROCEDURE — 72052 X-RAY EXAM NECK SPINE 6/>VWS: CPT | Mod: TC,PO

## 2020-11-05 PROCEDURE — 72110 X-RAY EXAM L-2 SPINE 4/>VWS: CPT | Mod: TC,PO

## 2020-11-05 PROCEDURE — 72141 MRI NECK SPINE W/O DYE: CPT | Mod: TC,PO

## 2020-11-06 ENCOUNTER — CLINICAL SUPPORT (OUTPATIENT)
Dept: REHABILITATION | Facility: HOSPITAL | Age: 50
End: 2020-11-06
Payer: COMMERCIAL

## 2020-11-06 DIAGNOSIS — R29.898 DECREASED RANGE OF MOTION OF NECK: ICD-10-CM

## 2020-11-06 DIAGNOSIS — M54.12 CERVICAL RADICULOPATHY: ICD-10-CM

## 2020-11-06 DIAGNOSIS — M54.2 CHRONIC NECK PAIN: ICD-10-CM

## 2020-11-06 DIAGNOSIS — G89.29 CHRONIC BILATERAL LOW BACK PAIN WITH LEFT-SIDED SCIATICA: ICD-10-CM

## 2020-11-06 DIAGNOSIS — G89.29 CHRONIC NECK PAIN: ICD-10-CM

## 2020-11-06 DIAGNOSIS — M54.42 CHRONIC BILATERAL LOW BACK PAIN WITH LEFT-SIDED SCIATICA: ICD-10-CM

## 2020-11-06 DIAGNOSIS — M62.89 MUSCLE TIGHTNESS: ICD-10-CM

## 2020-11-06 DIAGNOSIS — M62.81 MUSCLE WEAKNESS: ICD-10-CM

## 2020-11-06 DIAGNOSIS — R29.3 POSTURE IMBALANCE: ICD-10-CM

## 2020-11-06 DIAGNOSIS — M53.86 DECREASED RANGE OF MOTION OF LUMBAR SPINE: ICD-10-CM

## 2020-11-06 PROCEDURE — 97110 THERAPEUTIC EXERCISES: CPT | Mod: PN

## 2020-11-06 PROCEDURE — 97161 PT EVAL LOW COMPLEX 20 MIN: CPT | Mod: PN

## 2020-11-09 ENCOUNTER — TELEPHONE (OUTPATIENT)
Dept: GASTROENTEROLOGY | Facility: CLINIC | Age: 50
End: 2020-11-09

## 2020-11-09 ENCOUNTER — CLINICAL SUPPORT (OUTPATIENT)
Dept: REHABILITATION | Facility: HOSPITAL | Age: 50
End: 2020-11-09
Payer: COMMERCIAL

## 2020-11-09 DIAGNOSIS — M62.89 MUSCLE TIGHTNESS: ICD-10-CM

## 2020-11-09 DIAGNOSIS — M62.81 MUSCLE WEAKNESS: ICD-10-CM

## 2020-11-09 DIAGNOSIS — R29.898 DECREASED RANGE OF MOTION OF NECK: ICD-10-CM

## 2020-11-09 DIAGNOSIS — R29.3 POSTURE IMBALANCE: ICD-10-CM

## 2020-11-09 DIAGNOSIS — M53.86 DECREASED RANGE OF MOTION OF LUMBAR SPINE: ICD-10-CM

## 2020-11-09 PROCEDURE — 97140 MANUAL THERAPY 1/> REGIONS: CPT | Mod: PN,CQ

## 2020-11-09 PROCEDURE — 97110 THERAPEUTIC EXERCISES: CPT | Mod: PN,CQ

## 2020-11-09 NOTE — PLAN OF CARE
[facility]  Physical Therapy Initial Evaluation    Name: Dulce Mobley  Clinic Number: [id number]    Therapy Diagnosis:   Encounter Diagnoses   Name Primary?    Chronic neck pain     Cervical radiculopathy     Chronic bilateral low back pain with left-sided sciatica     Decreased range of motion of neck     Decreased range of motion of lumbar spine     Muscle weakness     Posture imbalance     Muscle tightness      Physician: Daisy Pettit PA-C    Physician Orders: PT Eval and Treat   Medical Diagnosis from Referral: M54.2,G89.29 (ICD-10-CM) - Chronic neck pain M54.12 (ICD-10-CM) - Cervical radiculopathy M54.42,G89.29 (ICD-10-CM) - Chronic bilateral low back pain with left-sided sciatica   Evaluation Date: 11/6/2020  Authorization Period Expiration: 12/31/2020  Plan of Care Expiration: 1/6/2021  Visit # / Visits authorized: 1/12    Time In: 10:46 AM  Time Out: 11:55 AM  Total Billable Time: 69 minutes    Precautions: Standard    Subjective   Date of onset: s/p fusion March 2020; recent MD 11/05/2020  History of current condition - Dulce reports: pain of the neck and back that radiates to the L hand and below knee of LLE, along with numbness of the L hand. The pt also reports sometimes dropping items carried in her left hand. The pain was more more diffuse before undergoing surgery for herniated disc at multiple levels of the cervical spine. It affects her sleep and she is only able to get about 3-4 hours a night due to constantly waking up and changing positions. The pt is unable to perform sufficient L side cervical rotation for safe driving, and has not been able to finish unpacking since moving here a month ago. She cannot tolerate prolonged physical activity or sustained positions. The pt also has difficulty with self care/dressing, overhead reaching and lifting loads.      Medical History:   Past Medical History:   Diagnosis Date    Cervical radiculopathy     Chronic low  back pain     Chronic neck pain     Gastroparesis     GERD (gastroesophageal reflux disease)     Hip bursitis     History of gestational diabetes     HLD (hyperlipidemia)     Hypothyroidism     Sciatica of left side        Surgical History:   Dulce Mobley  has a past surgical history that includes Cervical fusion; Hysterectomy; Salpingectomy;  section; Shoulder surgery (Right); Colonoscopy (2018); Upper gastrointestinal endoscopy (2020); and Upper gastrointestinal endoscopy (2018).    Medications:   Dulce has a current medication list which includes the following prescription(s): bethanechol, cyclobenzaprine, diazepam, docusate sodium, famotidine, gabapentin, levothyroxine, pantoprazole, and rosuvastatin.    Allergies:   Review of patient's allergies indicates:   Allergen Reactions    Clarithromycin Rash    Penicillins Rash        Imaging, MRI studies, X-Ray:   - Changes of instrumented anterior and posterior multilevel cervical fusion as above, without complicating features. No acute radiographic abnormalities. From C3 to C6, with separate instrumentation for anterior instrumented fusion at C6-7. Bone graft material appears adequately incorporated. There has also been posterior instrumented fusion from C2 to probably the T1 level.    - Grade 1 retrolisthesis of L1 on L2, which is stable on flexion/extension views. Mild multilevel lumbar spondylosis, most pronounced at L1-2.  Negative for compression fracture.    - Multilevel postsurgical and generative changes of the cervical spine as described. Focal myelomalacia involving cervical cord at level of C7 superior endplate.    Prior Therapy: PT following cervical fusion, ended about 6 weeks ago   Social History: lives with their spouse  and children in 1-story home  Occupation: stay at home mom  Prior Level of Function: Independent with ADLS and able to engage in functional and participatory activities without limitations or restrictions.    Current Level of Function: Restrictions and limitations with chores, self care, driving, walking and physical activity due to decreased ROM and pain. Difficulty driving due to limitations in cervical mobility     Pain:  Current 3/10, worst 9/10, best 2/10   Location: bilateral neck and back with radicular symptoms to left hand and leg   Description: Stinging   Aggravating Factors: Sitting, Standing, Laying, Walking, Night Time and cervical rotation.   Easing Factors: massage and stretching     Pts goals: to be able to turn head and stop pain.    Objective     Posture Alignment : forward head and protracted shoulders in sitting, In standing, pt with L posterior rotation of innominate, forward head, rounded shoulders.     Movement Analysis: increased time and effort with bed mobility, transitioning from side-lying, prone, supine and returning to sitting.     GAIT DEVIATIONS: Dulce displays no noticeable gait deviations.     ROM:  Cervical Range of Motion:    Degrees   Flexion 33   Extension 31   Right Rotation 30   Left Rotation 29*   Right Side Bending 29   Left Side Bending 19*      Shoulder A/PROM within functional limits with pain at end range.     Lumbar Range of Motion:   AROM ROM (% of normal) Normal   Flexion: 85% 60 deg   Extension: 75% 25 deg   Lateral Flex R: 50% 25 deg   Lateral Flex L: 50% 25 deg   Rotation R: 75% 18 deg   Rotation L: 75% 18 deg   *denotes pain    Strength:   Right Left Comment   Shoulder flexion: 4+/5 4+/5    Shoulder Abduction: 5/5 5/5    Elbow Flexion: 5/5 4+/5    Elbow Extension: 5/5 5/5     5/5 5/5         Right Left Comment   Hip Flexion: 5/5 5/5    Hip ABD: 4/5 4+/5    Hip ADD: 5/5 5/5    Hip Extension: 4-/5 3/5 Unable to get accurate measurement for LLE due to increased pain with resistance.    Knee Ext: 4+/5 5/5    Knee Flex: 4/5 5/5    Ankle DF: BLE 5/5     Strength:   L: 35#   R: 47#     Neurovascular:  - DTR: Patellar: BLE2+ Achilles BLE 2+  - Sensation: grossly  intact light touch across BUE and BLE  - Neural Tension Slump: R (-) L (+)  - Boggs's Sign: BUE negative  - Clonus: BLE negative    Palpation: Pt TTP with reproduction of radicular symptoms at L lumbar paraspinals L2 - L4 region. Pt TTP at R>L suboccipital musculature with global tightness throughout cervical paraspinals. Pt with myofascial restriction along posterior cervical scar. Pt with L posterior rotation of innominate.    Joint Play:  Recreation of LLE pain with CPA of L2-L4 spinous processes, unable to accurately assess joint mobility    CMS Impairment/Limitation/Restriction for FOTO Neck Survey    Therapist reviewed FOTO scores for Dulce Mobley on 11/6/2020.   FOTO documents entered into [employer] - see Media section.    Limitation Score: 44%         TREATMENT   Treatment Time In: 11:40 AM  Treatment Time Out: 11:50 AM  Total Treatment time separate from Evaluation: 10 minutes    Dulce received therapeutic exercises to develop strength, endurance, ROM, flexibility, posture and core stabilization for 10 minutes including:  Supine Cervical Rotation with towel roll x 10 each  SKTC x 10 each  Supine Sciatic Nerve Glide x 10 each  Push Pull 3 x 3''     (next visit: brueggers, STM to posterior cervical incision, scap retraction/rows, pelvic tilts, BKFO, DKTC w ball, bridge, supine vs SL clams)    Home Exercises and Patient Education Provided    Education provided:   - Examination findings   - lumbar and cervical anatomy     Written Home Exercises Provided: yes.  Exercises were reviewed and Dulce was able to demonstrate them prior to the end of the session.  Dulce demonstrated good  understanding of the education provided.     See EMR under Patient Instructions for exercises provided 11/6/2020.    Assessment   Dulce is a 50 y.o. female referred to outpatient Physical Therapy with a medical diagnosis of M54.2,G89.29 (ICD-10-CM) - Chronic neck pain M54.12 (ICD-10-CM) - Cervical radiculopathy M54.42,G89.29  (ICD-10-CM) - Chronic bilateral low back pain with left-sided sciatica. Pt demonstrates back and neck pain, decreased cervical and lumbar ROM, decreased BUE and BLE strength, and impaired functional mobility. Pt is a good candidate for skilled therapy services at this time to to increase cervical and lumbar ROM, increase extremity strength, decrease pain and improve functional mobility so that she can return to PLOF. Positive prognostic factors include prior compliance with PT and motivation to participate in recovery. Negative prognostic factors include chronicity of symptoms and extensive surgical and medical hx.     Pt prognosis is Good.   Pt will benefit from skilled outpatient Physical Therapy to address the deficits stated above and in the chart below, provide pt/family education, and to maximize pt's level of independence.     Plan of care discussed with patient: Yes  Pt's spiritual, cultural and educational needs considered and patient is agreeable to the plan of care and goals as stated below:     Anticipated Barriers for therapy: None    Medical Necessity is demonstrated by the following  History  Co-morbidities and personal factors that may impact the plan of care Co-morbidities:   difficulty sleeping, high BMI, prior abdominal surgery and prior pelvic surgery    Personal Factors:   no deficits     high   Examination  Body Structures and Functions, activity limitations and participation restrictions that may impact the plan of care Body Regions:   head  neck  back  lower extremities  upper extremities  trunk    Body Systems:    gross symmetry  ROM  strength  gross coordinated movement  transfers  transitions  motor control    Participation Restrictions:   Driving, self care, chores, heavy lifting and parental responsibilities.     Activity limitations:   Learning and applying knowledge  no deficits    General Tasks and Commands  no deficits    Communication  no deficits    Mobility  lifting and carrying  objects  walking  using transportation (bus, train, plane, car)  driving (bike, car, motorcycle)    Self care  washing oneself (bathing, drying, washing hands)  caring for body parts (brushing teeth, shaving, grooming)  dressing    Domestic Life  doing house work (cleaning house, washing dishes, laundry)    Interactions/Relationships  no deficits    Life Areas  no deficits    Community and Social Life  recreation and leisure         high   Clinical Presentation stable and uncomplicated low   Decision Making/ Complexity Score: low     Goals:  Short Term Goals: 4 weeks   Pt will be able to demonstrate proper upright posture without forward head without verbal cuing in order to alleviate additional stresses on spine.  Pt will be able to hold deep neck flexion for 10 sec without fatigue in order to improve upright posture  Report decreased neck and back pain < / = 5 /10 at worst to increase tolerance for ADLs  Pt will be able to perform bridging x 5 without pain and clearing the gluts to improve standing tolerance   Pt will be able to demonstrate a proper TA contraction in order to provide stability in lumbar spine with functional tasks.  Pt to tolerate HEP to improve ROM and independence with ADL's    Long Term Goals: 8 weeks   Increased cervical B sidebending and rotation AROM by > / = 10 degrees in order to tolerate driving  Increased MMT For B hip extension and abduction by 1 grade to increase tolerance for ADLs   Pt will report > or = 20% decrease in difficulty performing household chores in order to improve functional mobility.   Pt will increase B trunk rotation AROM by 10% in order to tolerate performing household chores without increase in pain.  Pt will be able to demonstrate proper lifting mechanics with light weights, without cueing in order to prevent future injuries.   Pt will be independent in HEP and symptom management    Plan   Plan of care Certification: 11/6/2020 to 1/6/2021.    Outpatient Physical  Therapy 2 times weekly for 8 weeks to include the following interventions: Cervical/Lumbar Traction, Electrical Stimulation IFC, Manual Therapy, Moist Heat/ Ice, Neuromuscular Re-ed, Patient Education, Self Care, Therapeutic Activites, Therapeutic Exercise, Ultrasound and Dry Needling.     Stephanie Burns, PT

## 2020-11-09 NOTE — PROGRESS NOTES
"  Physical Therapy Treatment Note     Name: Dulce Mobley  Clinic Number: [id number]    Therapy Diagnosis:   Encounter Diagnoses   Name Primary?    Decreased range of motion of neck     Decreased range of motion of lumbar spine     Muscle weakness     Posture imbalance     Muscle tightness      Physician: Daisy Pettit PA-C    Visit Date: 11/9/2020    Physician Orders: PT Eval and Treat   Medical Diagnosis from Referral: M54.2,G89.29 (ICD-10-CM) - Chronic neck pain M54.12 (ICD-10-CM) - Cervical radiculopathy M54.42,G89.29 (ICD-10-CM) - Chronic bilateral low back pain with left-sided sciatica   Evaluation Date: 11/6/2020  Authorization Period Expiration: 12/31/2020  Plan of Care Expiration: 1/6/2021  Visit # / Visits authorized: 2 /12     Time In: 1:45 PM  Time Out: 2:36 PM  Total Billable Time: 50 minutes     Precautions: Standard    Subjective     Pt reports: she was cleaning house yesterday to prepare for her mother coming into town and had significant increase of pain and soreness into the legs, especially to the anterior shins. L hand numb occasionally especially the last 2 digits but pain has not been too severe in the neck.  She was NOT compliant with home exercise program.  Response to previous treatment: some soreness  Functional change: none noted    Pain: 6/10 to the back and 0/10 to the neck but L radicular symptoms  Location: wallace and low back    Objective   L KS upon arrival  - corrected mostly with push/pull, pain did not change    Dulce received therapeutic exercises to develop strength, endurance, ROM, flexibility, posture and core stabilization for 40 minutes including:  Modified push/pull (R foot on mat/L hip flex) 3x3"  SKTC (towel behind knee) x 10 each  Supine Sciatic Nerve Glide (towel behind knee) x 10 each  Glut set hooklying 10 x 5"  Pelvic tilt 10 x 5"  Hip add + pelvic tilt 10 x 5"  Supine Cervical Rotation with towel roll x 10 each  Modified Push Pull 3 x 3''      (possible for next " visit: radha,  scap retraction/rows,  BKFO, DKTC w ball, bridge, supine vs SL clams)    Dulce received the following manual therapy techniques: Soft tissue Mobilization were applied to the: neck for 10 minutes, including:  STM along B UT and cervical paraspinals in supine (to perform in sitting next time with possible addition of cupping along incision)      Home Exercises Provided and Patient Education Provided     Education provided:   - importance of compliance to HEP for improved strength and stabilization of core and hips  - expectation for soreness and fluctuation of symptoms as pelvis stabilizes  - emphasis on push/pull as needed at home for symptom maintenance    Written Home Exercises Provided: yes.  Exercises were reviewed and Dulce was able to demonstrate them prior to the end of the session.  Dulce demonstrated good  understanding of the education provided.     See EMR under Patient Instructions for exercises provided initial eval and 11/9/20.    Assessment     Patient tolerated treatment well overall this date however continues with pain along the low back and into the L LE. Numbness into the L hand remains throughout session as well, noted exacerbation of symptoms with cervical rotation toward L. Difficulty with achieving pelvic tilt initially which improved with cues and repetition. Maintained bilateral exercises only this date in order to reduce potential onset of pelvic dysfunction. Patient with moderate core and hip weakness, some apprehension with movements due to anticipation of pain.   Dulce is progressing well towards her goals.   Pt prognosis is Good.     Pt will continue to benefit from skilled outpatient physical therapy to address the deficits listed in the problem list box on initial evaluation, provide pt/family education and to maximize pt's level of independence in the home and community environment.     Pt's spiritual, cultural and educational needs considered and pt agreeable  to plan of care and goals.     Anticipated barriers to physical therapy: none    Goals:   Short Term Goals: 4 weeks   Pt will be able to demonstrate proper upright posture without forward head without verbal cuing in order to alleviate additional stresses on spine.  Pt will be able to hold deep neck flexion for 10 sec without fatigue in order to improve upright posture  Report decreased neck and back pain < / = 5 /10 at worst to increase tolerance for ADLs  Pt will be able to perform bridging x 5 without pain and clearing the gluts to improve standing tolerance   Pt will be able to demonstrate a proper TA contraction in order to provide stability in lumbar spine with functional tasks.  Pt to tolerate HEP to improve ROM and independence with ADL's     Long Term Goals: 8 weeks   Increased cervical B sidebending and rotation AROM by > / = 10 degrees in order to tolerate driving  Increased MMT For B hip extension and abduction by 1 grade to increase tolerance for ADLs   Pt will report > or = 20% decrease in difficulty performing household chores in order to improve functional mobility.   Pt will increase B trunk rotation AROM by 10% in order to tolerate performing household chores without increase in pain.  Pt will be able to demonstrate proper lifting mechanics with light weights, without cueing in order to prevent future injuries.   Pt will be independent in HEP and symptom management      Plan     Continue with current POC toward established therapy goals.   (possible for next visit: brueggers,  scap retraction/rows,  BKFO, DKTC w ball, bridge, supine vs SL clams)    Parris Deras, PTA

## 2020-11-13 ENCOUNTER — OFFICE VISIT (OUTPATIENT)
Dept: ENDOCRINOLOGY | Facility: CLINIC | Age: 50
End: 2020-11-13
Payer: COMMERCIAL

## 2020-11-13 ENCOUNTER — LAB VISIT (OUTPATIENT)
Dept: LAB | Facility: HOSPITAL | Age: 50
End: 2020-11-13
Attending: INTERNAL MEDICINE
Payer: COMMERCIAL

## 2020-11-13 VITALS
WEIGHT: 178.69 LBS | HEIGHT: 62 IN | BODY MASS INDEX: 32.88 KG/M2 | SYSTOLIC BLOOD PRESSURE: 130 MMHG | DIASTOLIC BLOOD PRESSURE: 80 MMHG | OXYGEN SATURATION: 98 % | HEART RATE: 86 BPM

## 2020-11-13 DIAGNOSIS — Z00.00 PREVENTATIVE HEALTH CARE: ICD-10-CM

## 2020-11-13 DIAGNOSIS — E03.8 HYPOTHYROIDISM DUE TO HASHIMOTO'S THYROIDITIS: ICD-10-CM

## 2020-11-13 DIAGNOSIS — R73.03 PREDIABETES: ICD-10-CM

## 2020-11-13 DIAGNOSIS — E06.3 HYPOTHYROIDISM DUE TO HASHIMOTO'S THYROIDITIS: ICD-10-CM

## 2020-11-13 DIAGNOSIS — E78.2 MIXED HYPERLIPIDEMIA: ICD-10-CM

## 2020-11-13 DIAGNOSIS — Z86.32 HISTORY OF GESTATIONAL DIABETES: Primary | ICD-10-CM

## 2020-11-13 DIAGNOSIS — R03.0 ELEVATED BP WITHOUT DIAGNOSIS OF HYPERTENSION: ICD-10-CM

## 2020-11-13 LAB
ALBUMIN SERPL BCP-MCNC: 3.9 G/DL (ref 3.5–5.2)
ALP SERPL-CCNC: 138 U/L (ref 55–135)
ALT SERPL W/O P-5'-P-CCNC: 16 U/L (ref 10–44)
ANION GAP SERPL CALC-SCNC: 9 MMOL/L (ref 8–16)
AST SERPL-CCNC: 21 U/L (ref 10–40)
BASOPHILS # BLD AUTO: 0.06 K/UL (ref 0–0.2)
BASOPHILS NFR BLD: 0.8 % (ref 0–1.9)
BILIRUB SERPL-MCNC: 0.4 MG/DL (ref 0.1–1)
BUN SERPL-MCNC: 15 MG/DL (ref 6–20)
CALCIUM SERPL-MCNC: 9.4 MG/DL (ref 8.7–10.5)
CHLORIDE SERPL-SCNC: 104 MMOL/L (ref 95–110)
CHOLEST SERPL-MCNC: 137 MG/DL (ref 120–199)
CHOLEST/HDLC SERPL: 3 {RATIO} (ref 2–5)
CO2 SERPL-SCNC: 27 MMOL/L (ref 23–29)
CREAT SERPL-MCNC: 1 MG/DL (ref 0.5–1.4)
DIFFERENTIAL METHOD: ABNORMAL
EOSINOPHIL # BLD AUTO: 0.1 K/UL (ref 0–0.5)
EOSINOPHIL NFR BLD: 1.2 % (ref 0–8)
ERYTHROCYTE [DISTWIDTH] IN BLOOD BY AUTOMATED COUNT: 13 % (ref 11.5–14.5)
EST. GFR  (AFRICAN AMERICAN): >60 ML/MIN/1.73 M^2
EST. GFR  (NON AFRICAN AMERICAN): >60 ML/MIN/1.73 M^2
ESTIMATED AVG GLUCOSE: 123 MG/DL (ref 68–131)
GLUCOSE SERPL-MCNC: 90 MG/DL (ref 70–110)
HBA1C MFR BLD HPLC: 5.9 % (ref 4–5.6)
HCT VFR BLD AUTO: 44.6 % (ref 37–48.5)
HDLC SERPL-MCNC: 45 MG/DL (ref 40–75)
HDLC SERPL: 32.8 % (ref 20–50)
HGB BLD-MCNC: 13.9 G/DL (ref 12–16)
IMM GRANULOCYTES # BLD AUTO: 0.01 K/UL (ref 0–0.04)
IMM GRANULOCYTES NFR BLD AUTO: 0.1 % (ref 0–0.5)
LDLC SERPL CALC-MCNC: 67.4 MG/DL (ref 63–159)
LYMPHOCYTES # BLD AUTO: 3.1 K/UL (ref 1–4.8)
LYMPHOCYTES NFR BLD: 43.4 % (ref 18–48)
MCH RBC QN AUTO: 29.4 PG (ref 27–31)
MCHC RBC AUTO-ENTMCNC: 31.2 G/DL (ref 32–36)
MCV RBC AUTO: 94 FL (ref 82–98)
MONOCYTES # BLD AUTO: 0.4 K/UL (ref 0.3–1)
MONOCYTES NFR BLD: 5.8 % (ref 4–15)
NEUTROPHILS # BLD AUTO: 3.5 K/UL (ref 1.8–7.7)
NEUTROPHILS NFR BLD: 48.7 % (ref 38–73)
NONHDLC SERPL-MCNC: 92 MG/DL
NRBC BLD-RTO: 0 /100 WBC
PLATELET # BLD AUTO: 379 K/UL (ref 150–350)
PMV BLD AUTO: 10.2 FL (ref 9.2–12.9)
POTASSIUM SERPL-SCNC: 4 MMOL/L (ref 3.5–5.1)
PROT SERPL-MCNC: 7.3 G/DL (ref 6–8.4)
RBC # BLD AUTO: 4.73 M/UL (ref 4–5.4)
SODIUM SERPL-SCNC: 140 MMOL/L (ref 136–145)
TRIGL SERPL-MCNC: 123 MG/DL (ref 30–150)
TSH SERPL DL<=0.005 MIU/L-ACNC: 0.93 UIU/ML (ref 0.4–4)
WBC # BLD AUTO: 7.24 K/UL (ref 3.9–12.7)

## 2020-11-13 PROCEDURE — 99999 PR PBB SHADOW E&M-EST. PATIENT-LVL III: ICD-10-PCS | Mod: PBBFAC,,, | Performed by: INTERNAL MEDICINE

## 2020-11-13 PROCEDURE — 86803 HEPATITIS C AB TEST: CPT

## 2020-11-13 PROCEDURE — 36415 COLL VENOUS BLD VENIPUNCTURE: CPT | Mod: PN

## 2020-11-13 PROCEDURE — 85025 COMPLETE CBC W/AUTO DIFF WBC: CPT

## 2020-11-13 PROCEDURE — 1126F AMNT PAIN NOTED NONE PRSNT: CPT | Mod: S$GLB,,, | Performed by: INTERNAL MEDICINE

## 2020-11-13 PROCEDURE — 3008F PR BODY MASS INDEX (BMI) DOCUMENTED: ICD-10-PCS | Mod: CPTII,S$GLB,, | Performed by: INTERNAL MEDICINE

## 2020-11-13 PROCEDURE — 99999 PR PBB SHADOW E&M-EST. PATIENT-LVL III: CPT | Mod: PBBFAC,,, | Performed by: INTERNAL MEDICINE

## 2020-11-13 PROCEDURE — 84443 ASSAY THYROID STIM HORMONE: CPT

## 2020-11-13 PROCEDURE — 1126F PR PAIN SEVERITY QUANTIFIED, NO PAIN PRESENT: ICD-10-PCS | Mod: S$GLB,,, | Performed by: INTERNAL MEDICINE

## 2020-11-13 PROCEDURE — 99204 PR OFFICE/OUTPT VISIT, NEW, LEVL IV, 45-59 MIN: ICD-10-PCS | Mod: S$GLB,,, | Performed by: INTERNAL MEDICINE

## 2020-11-13 PROCEDURE — 99204 OFFICE O/P NEW MOD 45 MIN: CPT | Mod: S$GLB,,, | Performed by: INTERNAL MEDICINE

## 2020-11-13 PROCEDURE — 83036 HEMOGLOBIN GLYCOSYLATED A1C: CPT

## 2020-11-13 PROCEDURE — 80053 COMPREHEN METABOLIC PANEL: CPT

## 2020-11-13 PROCEDURE — 80061 LIPID PANEL: CPT

## 2020-11-13 PROCEDURE — 86703 HIV-1/HIV-2 1 RESULT ANTBDY: CPT

## 2020-11-13 PROCEDURE — 3008F BODY MASS INDEX DOCD: CPT | Mod: CPTII,S$GLB,, | Performed by: INTERNAL MEDICINE

## 2020-11-13 NOTE — PROGRESS NOTES
CHIEF COMPLAINT: Hypothyroid  50 y.o. old being seen as a new patient. Hypothyroidism diagnosed after delivering twins at age 43. On synthroid 88 mcg daily. Takes it by itself. She has some fatigue. Has insomnia due to back pain. Seeing back and spine. No Palpitations. No tremors. No diarrhea. Has some constipation.       PAST MEDICAL HISTORY/PAST SURGICAL HISTORY:  Reviewed in [facility]    SOCIAL HISTORY: Reviewed in [facility]    FAMILY HISTORY:  No immediate family members with thyroid disease. + DM 2.     MEDICATIONS/ALLERGIES: The patient's MedCard has been updated and reviewed.      ROS:   Constitutional: Weight stable   Eyes: No recent visual changes  ENT: No dysphagia  Cardiovascular: No CP or Palpitations  Respiratory: No SOB  Gastrointestinal: No Nausea/Vomiting  GenitoUrinary - No dysuria, No polyuria  Skin: No new skin rash  Neurologic: No HA  Remainder ROS negative        PE:    GENERAL: Well developed, well nourished.  PSYCH:  appropriate mood and affect  EYES:  PERRL, EOM intact.  ENT: Nares patent, oropharynx clear, mucosa pink,   NECK: Supple, trachea midline, no palpable thyroid nodules.   CHEST: Resp even and unlabored, CTA bilateral.  CARDIAC: RRR, S1, S2 heard, no murmurs, rubs, S3, or S4  ABDOMEN: Soft, non-tender, non-distended;  No organomegaly  VASCULAR:  DP pulses +2/4 bilaterally, no edema  NEURO: Gait steady, CN II-VII grossly intact  SKIN: No areas of breakdown, no acanthosis nigracans.      LABS/Radiology    (ADDENDUM- Reviewed Ultrasound done in Office 8/2020 found in previous endocrinologist note- Shows heterogeneous gland. No nodules seen)    ASSESSMENT/PLAN:  1. Hypothyroidism- taking it appropriately. Symptomatically doing well. Had labs drawn today which are pending.     2. Fatty Liver- discussed diet as well as exercise. Reviewed abd Ultrasound.     3. History of Gestational Diabetes- A1c pending. Discussed diet as well as exercise. Exercise limited due to back. She is getting  PT      FOLLOWUP  Copy of Ultrasound from previous Endocrinologist.   F/U 1 year with TSH, F[facility]

## 2020-11-13 NOTE — LETTER
November 13, 2020      Delia Nascimento MD  [address]  [place] [address]           [facility]  [address]  [place] [address]  Phone: [phone]  Fax: [phone]          Patient: Dulce Mobley   MR Number: [id number]   YOB: 1970   Date of Visit: 11/13/2020       Dear Dr. Delia Nascimento:    Thank you for referring Dulce Mobley to me for evaluation. Attached you will find relevant portions of my assessment and plan of care.    If you have questions, please do not hesitate to call me. I look forward to following Dulce Mobley along with you.    Sincerely,    Zhen Dawson,     Enclosure  CC:  No Recipients    If you would like to receive this communication electronically, please contact [email or web address] or [phone] to request more information on [employer] access.    For providers and/or their staff who would like to refer a patient to [facility], please contact us through our one-stop-shop provider referral line, [facility], at [phone].    If you feel you have received this communication in error or would no longer like to receive these types of communications, please e-mail [email or web address]

## 2020-11-16 LAB — HIV 1+2 AB+HIV1 P24 AG SERPL QL IA: NEGATIVE

## 2020-11-17 LAB — HCV AB SERPL QL IA: NEGATIVE

## 2020-11-19 ENCOUNTER — OFFICE VISIT (OUTPATIENT)
Dept: PHYSICAL MEDICINE AND REHAB | Facility: CLINIC | Age: 50
End: 2020-11-19
Payer: COMMERCIAL

## 2020-11-19 DIAGNOSIS — R20.2 PARESTHESIAS: ICD-10-CM

## 2020-11-19 DIAGNOSIS — G89.29 CHRONIC NECK PAIN: ICD-10-CM

## 2020-11-19 DIAGNOSIS — M54.2 CHRONIC NECK PAIN: ICD-10-CM

## 2020-11-19 DIAGNOSIS — Z98.1 HISTORY OF FUSION OF CERVICAL SPINE: ICD-10-CM

## 2020-11-19 PROCEDURE — 99499 NO LOS: ICD-10-PCS | Mod: S$GLB,,, | Performed by: PHYSICAL MEDICINE & REHABILITATION

## 2020-11-19 PROCEDURE — 95910 PR NERVE CONDUCTION STUDY; 7-8 STUDIES: ICD-10-PCS | Mod: S$GLB,,, | Performed by: PHYSICAL MEDICINE & REHABILITATION

## 2020-11-19 PROCEDURE — 95886 MUSC TEST DONE W/N TEST COMP: CPT | Mod: S$GLB,,, | Performed by: PHYSICAL MEDICINE & REHABILITATION

## 2020-11-19 PROCEDURE — 95910 NRV CNDJ TEST 7-8 STUDIES: CPT | Mod: S$GLB,,, | Performed by: PHYSICAL MEDICINE & REHABILITATION

## 2020-11-19 PROCEDURE — 99499 UNLISTED E&M SERVICE: CPT | Mod: S$GLB,,, | Performed by: PHYSICAL MEDICINE & REHABILITATION

## 2020-11-19 PROCEDURE — 99999 PR PBB SHADOW E&M-EST. PATIENT-LVL I: ICD-10-PCS | Mod: PBBFAC,,, | Performed by: PHYSICAL MEDICINE & REHABILITATION

## 2020-11-19 PROCEDURE — 99999 PR PBB SHADOW E&M-EST. PATIENT-LVL I: CPT | Mod: PBBFAC,,, | Performed by: PHYSICAL MEDICINE & REHABILITATION

## 2020-11-19 PROCEDURE — 95886 PR EMG COMPLETE, W/ NERVE CONDUCTION STUDIES, 5+ MUSCLES: ICD-10-PCS | Mod: S$GLB,,, | Performed by: PHYSICAL MEDICINE & REHABILITATION

## 2020-11-19 NOTE — PROGRESS NOTES
[facility]  [address] [place] [address]             Full Name: Dulce Mobley Gender: Female  Patient ID: [id number]  History: Pt c/o left upper extremity pain and paresthesias. She has a hx of c-spine surgery in March 2020. No DM. She denies right upper extremity symptoms.      Visit Date: 11/18/2020 16:44  Examining Physician: BROCK Fisher  Referring Physician: BJ Pettit      Sensory NCS      Nerve / Sites Rec. Site Onset Lat Peak Lat NP Amp PP Amp Segments Distance Velocity     ms ms µV µV  cm m/s   R Median - Digit III (Antidromic)      Wrist Dig III 3.18 3.96 32.1 55.6 Wrist - Dig III 14 44   L Median - Digit III (Antidromic)      Wrist Dig III 3.07 3.96 41.3 67.3 Wrist - Dig III 14 46   R Ulnar - Digit V (Antidromic)      Wrist Dig V 2.50 3.23 36.1 57.5 Wrist - Dig V 14 56   L Ulnar - Digit V (Antidromic)      Wrist Dig V 2.34 3.18 44.1 65.4 Wrist - Dig V 14 60       Motor NCS      Nerve / Sites Muscle Latency Amplitude Amp % Duration Segments Distance Lat Diff Velocity     ms mV % ms  cm ms m/s   L Median - APB      Wrist APB 3.75 9.4 100 7.81 Wrist - APB 8        Elbow APB 7.14 8.4 90.2 7.86 Elbow - Wrist 19 3.39 56   R Median - APB      Wrist APB 3.49 11.1 100 7.24 Wrist - APB 8        Elbow APB 6.93 10.6 95.8 7.29 Elbow - Wrist 20 3.44 58   L Ulnar - ADM      Wrist ADM 2.66 9.7 100 6.20 Wrist - ADM 8        B.Elbow ADM 5.00 9.0 93 6.25 B.Elbow - Wrist 14 2.34 60      A.Elbow ADM 6.25 9.0 93.2 6.09 A.Elbow - B.Elbow 10 1.25 80       EMG         EMG Summary Table     Spontaneous MUAP Recruitment   Muscle IA Fib PSW Fasc H.F. Amp Dur. PPP Pattern   L. Deltoid N None None None None N N N N   L. Biceps brachii N None None None None N N N N   L. Triceps brachii N None None None None N N N N   L. Pronator teres N None None None None N N N N   L. First dorsal interosseous N None None None None N N N N   L. Abductor pollicis brevis N None None None None N N N N       Summary    The motor  conduction test was normal in all 3 of the tested nerves: L Median - APB, R Median - APB, L Ulnar - ADM.    The sensory conduction test had results within normal range in 2 of the tested nerves: R Ulnar - Digit V (Antidromic), R Ulnar - Digit V (Antidromic).  There were results outside of the specified normal range in 2 of the tested nerves:   In the R Median - Digit III (Antidromic) study  o the peak latency result was increased for Wrist stimulation   In the L Median - Digit III (Antidromic) study  o the peak latency result was increased for Wrist stimulation    The needle EMG study was normal in all 6 tested muscles: L. Deltoid, L. Biceps brachii, L. Triceps brachii, L. Pronator teres, L. First dorsal interosseous, L. Abductor pollicis brevis.      Electrodiagnostic Impression:  1. 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  2. There was insufficient electrodiagnostic evidence for diagnoses of peripheral polyneuropathy, myopathy, or active axonal cervical radiculopathy/brachial plexopathy of the left upper extremity.    Summary:  Mild bilateral carpal tunnel syndrome.    Plan:  Today's test results will be sent to her treating providers for further review and direction in her treatment.    Thank you very much for the referral. Please call if you have any questions regarding this study or the report.       -------------------------------  Pro Fisher M.D.

## 2020-11-19 NOTE — LETTER
November 19, 2020      Daisy Pettit PA-C  [address]  [address]  [facility] [address]           [facility]  [address] [place] [address]  Phone: [phone]  Fax: [phone]          Patient: Dulce Mobley   MR Number: [id number]   YOB: 1970   Date of Visit: 11/19/2020       Dear Daisy Pettit:    Thank you for referring Dulce Mobley to me for evaluation. Attached you will find relevant portions of my assessment and plan of care.    If you have questions, please do not hesitate to call me. I look forward to following Dulce Mobley along with you.    Sincerely,    Pro Fisher MD    Enclosure  CC:  No Recipients    If you would like to receive this communication electronically, please contact [email or web address] or [phone] to request more information on [employer] Link access.    For providers and/or their staff who would like to refer a patient to [facility], please contact us through our one-stop-shop provider referral line, [facility], at [phone].    If you feel you have received this communication in error or would no longer like to receive these types of communications, please e-mail [email or web address]

## 2020-11-23 ENCOUNTER — OFFICE VISIT (OUTPATIENT)
Dept: SPINE | Facility: CLINIC | Age: 50
End: 2020-11-23
Payer: COMMERCIAL

## 2020-11-23 VITALS
HEART RATE: 80 BPM | WEIGHT: 178.81 LBS | DIASTOLIC BLOOD PRESSURE: 83 MMHG | HEIGHT: 62 IN | SYSTOLIC BLOOD PRESSURE: 136 MMHG | BODY MASS INDEX: 32.91 KG/M2

## 2020-11-23 DIAGNOSIS — M54.9 DORSALGIA, UNSPECIFIED: ICD-10-CM

## 2020-11-23 DIAGNOSIS — G89.29 CHRONIC BILATERAL LOW BACK PAIN WITH LEFT-SIDED SCIATICA: Primary | ICD-10-CM

## 2020-11-23 DIAGNOSIS — Z98.1 HISTORY OF FUSION OF CERVICAL SPINE: ICD-10-CM

## 2020-11-23 DIAGNOSIS — M54.2 CHRONIC NECK PAIN: ICD-10-CM

## 2020-11-23 DIAGNOSIS — M54.42 CHRONIC BILATERAL LOW BACK PAIN WITH LEFT-SIDED SCIATICA: Primary | ICD-10-CM

## 2020-11-23 DIAGNOSIS — G89.29 CHRONIC NECK PAIN: ICD-10-CM

## 2020-11-23 DIAGNOSIS — M54.12 CERVICAL RADICULOPATHY: ICD-10-CM

## 2020-11-23 PROCEDURE — 99214 OFFICE O/P EST MOD 30 MIN: CPT | Mod: S$GLB,,, | Performed by: PHYSICIAN ASSISTANT

## 2020-11-23 PROCEDURE — 3008F BODY MASS INDEX DOCD: CPT | Mod: CPTII,S$GLB,, | Performed by: PHYSICIAN ASSISTANT

## 2020-11-23 PROCEDURE — 99999 PR PBB SHADOW E&M-EST. PATIENT-LVL IV: CPT | Mod: PBBFAC,,, | Performed by: PHYSICIAN ASSISTANT

## 2020-11-23 PROCEDURE — 1125F AMNT PAIN NOTED PAIN PRSNT: CPT | Mod: S$GLB,,, | Performed by: PHYSICIAN ASSISTANT

## 2020-11-23 PROCEDURE — 99999 PR PBB SHADOW E&M-EST. PATIENT-LVL IV: ICD-10-PCS | Mod: PBBFAC,,, | Performed by: PHYSICIAN ASSISTANT

## 2020-11-23 PROCEDURE — 3008F PR BODY MASS INDEX (BMI) DOCUMENTED: ICD-10-PCS | Mod: CPTII,S$GLB,, | Performed by: PHYSICIAN ASSISTANT

## 2020-11-23 PROCEDURE — 99214 PR OFFICE/OUTPT VISIT, EST, LEVL IV, 30-39 MIN: ICD-10-PCS | Mod: S$GLB,,, | Performed by: PHYSICIAN ASSISTANT

## 2020-11-23 PROCEDURE — 1125F PR PAIN SEVERITY QUANTIFIED, PAIN PRESENT: ICD-10-PCS | Mod: S$GLB,,, | Performed by: PHYSICIAN ASSISTANT

## 2020-11-23 RX ORDER — DIAZEPAM 10 MG/1
10 TABLET ORAL ONCE
Qty: 1 TABLET | Refills: 0 | Status: SHIPPED | OUTPATIENT
Start: 2020-11-23 | End: 2020-11-23

## 2020-11-23 NOTE — PROGRESS NOTES
"  Physical Therapy Treatment Note     Name: Dulce Mobley  Clinic Number: [id number]    Therapy Diagnosis:   Encounter Diagnoses   Name Primary?    Decreased range of motion of neck     Decreased range of motion of lumbar spine     Muscle weakness     Posture imbalance     Muscle tightness      Physician: Daisy Pettit PA-C    Visit Date: 11/24/2020    Physician Orders: PT Eval and Treat   Medical Diagnosis from Referral: M54.2,G89.29 (ICD-10-CM) - Chronic neck pain M54.12 (ICD-10-CM) - Cervical radiculopathy M54.42,G89.29 (ICD-10-CM) - Chronic bilateral low back pain with left-sided sciatica   Evaluation Date: 11/6/2020  Authorization Period Expiration: 12/31/2020  Plan of Care Expiration: 1/6/2021  Visit # / Visits authorized: 3 /12     Time In: 9:18 PM  Time Out: 10:04 PM  Total Billable Time: 44 minutes     Precautions: Standard    Subjective     Pt reports: she has not been sleeping well since her son was in the hospital last week. Having an increase of discomfort to the R sided low back, more laterally. L hand only numb into the tip of her pinky finger.  She was NOT compliant with home exercise program.  Response to previous treatment: some soreness  Functional change: none noted    Pain: 6/10 to the back and 0/10 to the neck but L radicular symptoms  Location: neck and low back    Objective   L KS upon arrival  - corrected mostly with push/pull, pain did not change    Dulce received therapeutic exercises to develop strength, endurance, ROM, flexibility, posture and core stabilization for 34 minutes including:  Modified push/pull (R foot on mat/L hip flex) 3x3"  SKTC (towel behind knee) x 10 each  Supine Sciatic Nerve Glide (towel behind knee) x 10 each  Glut set hooklying 10 x 5"  Pelvic tilt 10 x 5" - reports discomfort to the low back  Hip add + pelvic tilt 10 x 5"  LTR x20 (small pain free ROM)   Supine Cervical Rotation with towel roll x 10 each  Modified Push Pull 3 x 3''      (possible for next " visit: yonygers,  scap retraction/rows,  BKFO, DKTC w ball, bridge, supine vs SL clams)    Dulce received the following manual therapy techniques: Soft tissue Mobilization were applied to the: neck for 10 minutes, including:  STM along B UT and cervical paraspinals in supine (to perform in sitting next time with possible addition of cupping along incision)      Home Exercises Provided and Patient Education Provided     Education provided:   - importance of compliance to HEP for improved strength and stabilization of core and hips  - expectation for soreness and fluctuation of symptoms as pelvis stabilizes  - emphasis on push/pull as needed at home for symptom maintenance    Written Home Exercises Provided: yes.  Exercises were reviewed and Dulce was able to demonstrate them prior to the end of the session.  Dulce demonstrated good  understanding of the education provided.     See EMR under Patient Instructions for exercises provided initial eval and 11/9/20.    Assessment     Patient presents with increased tightness, pain, and discomfort along the low back this date with no change of symptoms with performance of push/pull or completion of exercises. Pt miss a week of therapy and had not been compliant with home exercises, which may have also contributed to increased symptoms. Slight reduced tightness with addition of LTR within pain free ranges. Difficulty with glut set and pelvic tilt due to pain. Attempted manual techniques in sitting but moved back to supine due to lumbar discomfort in seated position. Dulce continues with moderate core and hip weakness, some apprehension with movements due to anticipation of pain.   Dulce is progressing well towards her goals.   Pt prognosis is Good.     Pt will continue to benefit from skilled outpatient physical therapy to address the deficits listed in the problem list box on initial evaluation, provide pt/family education and to maximize pt's level of independence in  the home and community environment.     Pt's spiritual, cultural and educational needs considered and pt agreeable to plan of care and goals.     Anticipated barriers to physical therapy: none    Goals:   Short Term Goals: 4 weeks   Pt will be able to demonstrate proper upright posture without forward head without verbal cuing in order to alleviate additional stresses on spine.  Pt will be able to hold deep neck flexion for 10 sec without fatigue in order to improve upright posture  Report decreased neck and back pain < / = 5 /10 at worst to increase tolerance for ADLs  Pt will be able to perform bridging x 5 without pain and clearing the gluts to improve standing tolerance   Pt will be able to demonstrate a proper TA contraction in order to provide stability in lumbar spine with functional tasks.  Pt to tolerate HEP to improve ROM and independence with ADL's     Long Term Goals: 8 weeks   Increased cervical B sidebending and rotation AROM by > / = 10 degrees in order to tolerate driving  Increased MMT For B hip extension and abduction by 1 grade to increase tolerance for ADLs   Pt will report > or = 20% decrease in difficulty performing household chores in order to improve functional mobility.   Pt will increase B trunk rotation AROM by 10% in order to tolerate performing household chores without increase in pain.  Pt will be able to demonstrate proper lifting mechanics with light weights, without cueing in order to prevent future injuries.   Pt will be independent in HEP and symptom management      Plan     Continue with current POC toward established therapy goals.   (possible for next visit: brueggers,  scap retraction/rows,  BKFO, DKTC w ball, bridge, supine vs SL clams)    Parris Deras, PTA

## 2020-11-24 ENCOUNTER — CLINICAL SUPPORT (OUTPATIENT)
Dept: REHABILITATION | Facility: HOSPITAL | Age: 50
End: 2020-11-24
Payer: COMMERCIAL

## 2020-11-24 DIAGNOSIS — R29.898 DECREASED RANGE OF MOTION OF NECK: ICD-10-CM

## 2020-11-24 DIAGNOSIS — R29.3 POSTURE IMBALANCE: ICD-10-CM

## 2020-11-24 DIAGNOSIS — M62.81 MUSCLE WEAKNESS: ICD-10-CM

## 2020-11-24 DIAGNOSIS — M62.89 MUSCLE TIGHTNESS: ICD-10-CM

## 2020-11-24 DIAGNOSIS — M53.86 DECREASED RANGE OF MOTION OF LUMBAR SPINE: ICD-10-CM

## 2020-11-24 PROCEDURE — 97140 MANUAL THERAPY 1/> REGIONS: CPT | Mod: PN,CQ

## 2020-11-24 PROCEDURE — 97110 THERAPEUTIC EXERCISES: CPT | Mod: PN,CQ

## 2020-11-24 NOTE — PROGRESS NOTES
Back and Spine Follow Up    Patient ID: Dulce Mobley is a 50 y.o. female.    Chief Complaint   Patient presents with    Follow-up     MRI, X-ray, and EMG results for neck and low back pain.        Review of Systems   Constitutional: Negative for activity change, appetite change, chills, fever and unexpected weight change.   HENT: Negative for tinnitus, trouble swallowing and voice change.    Respiratory: Negative for apnea, cough, chest tightness and shortness of breath.    Cardiovascular: Negative for chest pain and palpitations.   Gastrointestinal: Negative for constipation, diarrhea, nausea and vomiting.   Genitourinary: Negative for difficulty urinating, dysuria, frequency and urgency.   Musculoskeletal: Positive for arthralgias, back pain, myalgias and neck pain. Negative for gait problem and neck stiffness.   Skin: Negative for wound.   Neurological: Positive for weakness and numbness. Negative for dizziness, tremors, seizures, facial asymmetry, speech difficulty, light-headedness and headaches.   Psychiatric/Behavioral: Negative for confusion and decreased concentration.       Past Medical History:   Diagnosis Date    Cervical radiculopathy     Chronic low back pain     Chronic neck pain     Gastroparesis     GERD (gastroesophageal reflux disease)     Hip bursitis     History of gestational diabetes     HLD (hyperlipidemia)     Hypothyroidism     Sciatica of left side      Social History     Socioeconomic History    Marital status:      Spouse name: Not on file    Number of children: Not on file    Years of education: Not on file    Highest education level: Not on file   Occupational History    Not on file   Social Needs    Financial resource strain: Not on file    Food insecurity     Worry: Not on file     Inability: Not on file    Transportation needs     Medical: Not on file     Non-medical: Not on file   Tobacco Use    Smoking status: Former Smoker     Types: Cigarettes     Smokeless tobacco: Never Used    Tobacco comment: Quit in 2009   Substance and Sexual Activity    Alcohol use: Yes     Frequency: Monthly or less     Drinks per session: 1 or 2     Binge frequency: Never    Drug use: Never    Sexual activity: Yes     Partners: Male     Birth control/protection: See Surgical Hx     Comment: occ.   Lifestyle    Physical activity     Days per week: Not on file     Minutes per session: Not on file    Stress: Not on file   Relationships    Social connections     Talks on phone: Not on file     Gets together: Not on file     Attends Yazdanism service: Not on file     Active member of club or organization: Not on file     Attends meetings of clubs or organizations: Not on file     Relationship status: Not on file   Other Topics Concern    Not on file   Social History Narrative    Not currently working, recently moved from [place]     Family History   Problem Relation Age of Onset    Diabetes Mother     Hypertension Mother     Diabetes Father     Hypertension Father     Colon cancer Neg Hx      Review of patient's allergies indicates:   Allergen Reactions    Clarithromycin Rash    Penicillins Rash       Current Outpatient Medications:     bethanechol (URECHOLINE) 25 MG Tab, bethanechol chloride 25 mg tablet  Take 1 tablet 4 times a day by oral route., Disp: , Rfl:     cyclobenzaprine (FLEXERIL) 5 MG tablet, Take 1 tablet (5 mg total) by mouth every 12 (twelve) hours as needed for Muscle spasms., Disp: 40 tablet, Rfl: 0    docusate sodium (COLACE) 100 MG capsule, Take 100 mg by mouth once daily., Disp: , Rfl:     famotidine (PEPCID) 40 MG tablet, Take 1 tablet (40 mg total) by mouth nightly., Disp: 30 tablet, Rfl: 2    gabapentin (NEURONTIN) 300 MG capsule, gabapentin 300 mg capsule  Take 1 capsule 3 times a day by oral route., Disp: , Rfl:     levothyroxine (SYNTHROID) 88 MCG tablet, levothyroxine 88 mcg tablet  1 TAB PO QAM on empty stomach  No food/drink/med x 1 hour;  "no vitamins x 4 hours., Disp: , Rfl:     pantoprazole (PROTONIX) 40 MG tablet, pantoprazole 40 mg tablet,delayed release  Take 1 tablet every day by oral route., Disp: , Rfl:     rosuvastatin (CRESTOR) 10 MG tablet, rosuvastatin 10 mg tablet  TAKE 1 TABLET BY MOUTH EVERY DAY, Disp: , Rfl:     diazePAM (VALIUM) 10 MG Tab, Take 1 tablet (10 mg total) by mouth once. 30 minutes prior to MRI. for 1 dose, Disp: 1 tablet, Rfl: 0    Vitals:    11/23/20 1038   BP: 136/83   BP Location: Left arm   Patient Position: Sitting   BP Method: Large (Automatic)   Pulse: 80   Weight: 81.1 kg (178 lb 12.7 oz)   Height: 5' 2" (1.575 m)       Physical Exam  Vitals signs and nursing note reviewed.   Constitutional:       Appearance: She is well-developed.   HENT:      Head: Normocephalic and atraumatic.   Eyes:      Pupils: Pupils are equal, round, and reactive to light.   Neck:      Musculoskeletal: Normal range of motion and neck supple.   Cardiovascular:      Rate and Rhythm: Normal rate.   Pulmonary:      Effort: Pulmonary effort is normal.   Musculoskeletal: Normal range of motion.   Skin:     General: Skin is warm and dry.   Neurological:      Mental Status: She is alert and oriented to person, place, and time.      Coordination: Finger-Nose-Finger Test, Heel to Shin Test and Romberg Test normal.      Gait: Gait is intact. Tandem walk normal.      Deep Tendon Reflexes:      Reflex Scores:       Tricep reflexes are 2+ on the right side and 2+ on the left side.       Bicep reflexes are 2+ on the right side and 2+ on the left side.       Brachioradialis reflexes are 2+ on the right side and 2+ on the left side.       Patellar reflexes are 2+ on the right side and 2+ on the left side.       Achilles reflexes are 2+ on the right side and 2+ on the left side.  Psychiatric:         Speech: Speech normal.         Behavior: Behavior normal.         Thought Content: Thought content normal.         Judgment: Judgment normal. "         Neurologic Exam     Mental Status   Oriented to person, place, and time.   Oriented to person.   Oriented to place.   Oriented to time.   Follows 3 step commands.   Attention: normal. Concentration: normal.   Speech: speech is normal   Level of consciousness: alert  Knowledge: consistent with education.   Able to name object. Able to read. Able to repeat. Able to write. Normal comprehension.     Cranial Nerves     CN II   Visual acuity: normal  Right visual field deficit: none  Left visual field deficit: none     CN III, IV, VI   Pupils are equal, round, and reactive to light.  Right pupil: Size: 3 mm. Shape: regular. Reactivity: brisk. Consensual response: intact.   Left pupil: Size: 3 mm. Shape: regular. Reactivity: brisk. Consensual response: intact.   CN III: no CN III palsy  CN VI: no CN VI palsy  Nystagmus: none   Diplopia: none  Ophthalmoparesis: none  Conjugate gaze: present    CN V   Right facial sensation deficit: none  Left facial sensation deficit: none    CN VII   Right facial weakness: none  Left facial weakness: none    CN VIII   Hearing: intact    CN IX, X   CN IX normal.   CN X normal.     CN XI   Right sternocleidomastoid strength: normal  Left sternocleidomastoid strength: normal  Right trapezius strength: normal  Left trapezius strength: normal    CN XII   Fasciculations: absent  Tongue deviation: none    Motor Exam   Muscle bulk: normal  Overall muscle tone: normal  Right arm pronator drift: absent  Left arm pronator drift: absent    Strength   Right neck flexion: 5/5  Left neck flexion: 5/5  Right neck extension: 5/5  Left neck extension: 5/5  Right deltoid: 5/5  Left deltoid: 5/5  Right biceps: 5/5  Left biceps: 5/5  Right triceps: 5/5  Left triceps: 5/5  Right wrist flexion: 5/5  Left wrist flexion: 5/5  Right wrist extension: 5/5  Left wrist extension: 5/5  Right interossei: 5/5  Left interossei: 5/5  Right abdominals: 5/5  Left abdominals: 5/5  Right iliopsoas: 5/5  Left iliopsoas:  5/5  Right quadriceps: 5/5  Left quadriceps: 5/5  Right hamstrin/5  Left hamstrin/5  Right glutei: 5/5  Left glutei: 5/5  Right anterior tibial: 5/5  Left anterior tibial: 5/5  Right posterior tibial: 5/5  Left posterior tibial: 5/5  Right peroneal: 5/5  Left peroneal: 5/5  Right gastroc: 5/5  Left gastroc: 5/5    Sensory Exam   Right arm light touch: normal  Left arm light touch: normal  Right leg light touch: normal  Left leg light touch: normal  Right arm vibration: normal  Left arm vibration: normal  Right arm pinprick: normal  Left arm pinprick: normal    Gait, Coordination, and Reflexes     Gait  Gait: normal    Coordination   Romberg: negative  Finger to nose coordination: normal  Heel to shin coordination: normal  Tandem walking coordination: normal    Tremor   Resting tremor: absent  Intention tremor: absent  Action tremor: absent    Reflexes   Right brachioradialis: 2+  Left brachioradialis: 2+  Right biceps: 2+  Left biceps: 2+  Right triceps: 2+  Left triceps: 2+  Right patellar: 2+  Left patellar: 2+  Right achilles: 2+  Left achilles: 2+  Right Boggs: absent  Left Boggs: absent  Right ankle clonus: absent  Left ankle clonus: absent      Provider dictation:    50 year old female former smoker with chronic pain, GERD and hyperlipidemia presents for follow up of of neck and back pain after undergoing imaging and nerve testing.  There have been no significant changes since last visit.  She is more concerned today about back pain then neck pain.    She has had 2 cervical spine surgeries - 2007 and 2020 ([place]).  Her last surgery was 3-9-20 of posterior decompression and fustion, C3/4, C5/6, C6/7, C7/T1.  She underwent 2 months of PT after her last surgery, but continued to have pain.  She has pain in the posterior neck.  It is associated with numbness/ pain from the left elbow to the hand affecting the 4th and 5th digits, but now also the 3rd digit.  Pain increases with  touching the hand.      She has had lower back pain for 8-9 months.  It is felt in the left side of the lower lumbar region with radiation into the left anterolateral thigh to the shin.  Pain increases with sitting/ walking.  She denies numbness and tingling.      She is taking zanaflex and gabapentin for pain control.  She underwent PT for the neck in May 2020.  She has restarted PT for the neck and lower back.  She has not had SHANTELLE.    NDI:  26%.  Oswestry score: 26%.  PHQ:  15.    On exam, she has 5/5 strength with 2+ DTR and no sensory deficits.  She had anterior and posterior neck surgical scars.  Gait and station fluid.  Denies bowel/ bladder dysfunction.  Negative herrera.    Xray and MRI cervical spine from 11-5-20 reviewed.  There is evidence of extensive anterior and posterior neck fusion.  There is no evidence of hardware malfunction nor instability on flexion/ extension.  Chronic myelomalacia is noted at C7 with no current cord compression or central canal stenosis.  At C7/T1 there is a posterior osteophyte with moderate left foraminal narrowing.    EMG/ NCV of the bilateral upper extremities performed 11-19-20 reveals:  Mild bilateral carpal tunnel syndrome with no evidence of cervical radiculopathy.    Xray lumbar spine from 11-5-20 reviewed.  Multilevel disk space narrowing noted.  Retrolisthesis of L1 on L2 noted measuring 4 mm and is stable on flexion/ extension.    IN conclusion, Ms. Cardona has had prior anterior and posterior cervical spine fusion with continued pain and left arm radiculopathy due to C7/T1 spondylosis with left foraminal narrowing.  We dicussed PT vs SHANTELLE to help control pain. She is more concerned about her lower back pain and left leg pain which can be related to degenerative changes.  She would like to consider injections for her lower back to help with pain; therefore we will obtain MRI lumbar spine and have her follow up with pain management.  In the mean time, she should  continue with PT.  Follow up with me as needed.      Visit Diagnosis:  Chronic bilateral low back pain with left-sided sciatica  -     MRI Lumbar Spine Without Contrast; Future; Expected date: 11/23/2020  -     Ambulatory referral/consult to Pain Clinic; Future; Expected date: 11/30/2020    Dorsalgia, unspecified  -     MRI Lumbar Spine Without Contrast; Future; Expected date: 11/23/2020    Chronic neck pain  -     diazePAM (VALIUM) 10 MG Tab; Take 1 tablet (10 mg total) by mouth once. 30 minutes prior to MRI. for 1 dose  Dispense: 1 tablet; Refill: 0    Cervical radiculopathy  -     diazePAM (VALIUM) 10 MG Tab; Take 1 tablet (10 mg total) by mouth once. 30 minutes prior to MRI. for 1 dose  Dispense: 1 tablet; Refill: 0    History of fusion of cervical spine  -     diazePAM (VALIUM) 10 MG Tab; Take 1 tablet (10 mg total) by mouth once. 30 minutes prior to MRI. for 1 dose  Dispense: 1 tablet; Refill: 0        Total time spent counseling greater than fifty percent of total visit time.  Counseling included discussion regarding imaging findings, diagnosis possibilities, treatment options, risks and benefits.   The patient had many questions regarding the options and long-term effects.

## 2020-11-27 ENCOUNTER — PATIENT OUTREACH (OUTPATIENT)
Dept: ADMINISTRATIVE | Facility: HOSPITAL | Age: 50
End: 2020-11-27

## 2020-11-30 ENCOUNTER — PATIENT MESSAGE (OUTPATIENT)
Dept: FAMILY MEDICINE | Facility: CLINIC | Age: 50
End: 2020-11-30

## 2020-11-30 ENCOUNTER — CLINICAL SUPPORT (OUTPATIENT)
Dept: REHABILITATION | Facility: HOSPITAL | Age: 50
End: 2020-11-30
Payer: COMMERCIAL

## 2020-11-30 DIAGNOSIS — R29.3 POSTURE IMBALANCE: ICD-10-CM

## 2020-11-30 DIAGNOSIS — M62.89 MUSCLE TIGHTNESS: ICD-10-CM

## 2020-11-30 DIAGNOSIS — M62.81 MUSCLE WEAKNESS: ICD-10-CM

## 2020-11-30 DIAGNOSIS — R29.898 DECREASED RANGE OF MOTION OF NECK: ICD-10-CM

## 2020-11-30 DIAGNOSIS — M53.86 DECREASED RANGE OF MOTION OF LUMBAR SPINE: ICD-10-CM

## 2020-11-30 PROCEDURE — 97110 THERAPEUTIC EXERCISES: CPT | Mod: PN

## 2020-11-30 PROCEDURE — 97140 MANUAL THERAPY 1/> REGIONS: CPT | Mod: PN

## 2020-11-30 NOTE — TELEPHONE ENCOUNTER
Patient requesting refill on Rosuvastatin 10 mg.      LOV- 10/27/2020  Future ov- 12/04/2020      Please review and approve. Thank you.

## 2020-11-30 NOTE — PROGRESS NOTES
"  Physical Therapy Treatment Note     Name: Dulce Mobley  Clinic Number: [id number]    Therapy Diagnosis:   Encounter Diagnoses   Name Primary?    Decreased range of motion of neck     Decreased range of motion of lumbar spine     Muscle weakness     Posture imbalance     Muscle tightness      Physician: Daisy Pettit PA-C    Visit Date: 11/30/2020    Physician Orders: PT Eval and Treat   Medical Diagnosis from Referral: M54.2,G89.29 (ICD-10-CM) - Chronic neck pain M54.12 (ICD-10-CM) - Cervical radiculopathy M54.42,G89.29 (ICD-10-CM) - Chronic bilateral low back pain with left-sided sciatica   Evaluation Date: 11/6/2020  Authorization Period Expiration: 12/31/2020  Plan of Care Expiration: 1/6/2021  Visit # / Visits authorized: 4 /12     Time In: 12:15 PM  Time Out: 1:05 PM   Total Billable Time: 50 minutes     Precautions: Standard    Subjective     Pt reports: she is feeling better today than she was last week. Pt reports increased pain to the R neck and shoulder today, but the back is feeling better. Pt states she still experiences symptoms into the LLE with prolonged sitting.   She was compliant with home exercise program.  Response to previous treatment: some soreness  Functional change: none noted    Pain: 2/10 to the back and 2/10 to the neck but L radicular symptoms  Location: neck and low back    Objective   L AR upon arrival - corrected with MET    Dulce received therapeutic exercises to develop strength, endurance, ROM, flexibility, posture and core stabilization for 35 minutes including:  Modified push/pull L AR 3 x 3"   SKTC (towel behind knee) x 10 each  Supine Sciatic Nerve Glide (towel behind knee) x 10 each  Glut set hooklying 10 x 5"  Bridge GTB x 10   Supine Clam w TA set GTB 2 x 10   Hip add + pelvic tilt 20 x 5"  LTR 10 x 5" hold   Modified Push Pull 3 x 3''   Rows BTB x 10   Shld Ext BTB x 10   Brueggers GTB x 10     (NP) Pelvic tilt 10 x 5" - reports discomfort to the low back  (NP) " Supine Cervical Rotation with towel roll x 10 each      Dulce received the following manual therapy techniques: Soft tissue Mobilization were applied to the: neck for 15 minutes, including:  STM along B UT and cervical paraspinals in supine   Vacuum/cupping STM with manual therapy techniques was performed to B UT and at distal posterior cervical incision to decrease muscle tightness, increase circulation and promote healing process.  The pt's skin was monitored for redness adjusting pressure as needed. The pt was instructed in possible side effects of bruising and/or soreness.        Home Exercises Provided and Patient Education Provided     Education provided:   - importance of compliance to HEP for improved strength and stabilization of core and hips  - expectation for soreness and fluctuation of symptoms as pelvis stabilizes  - emphasis on push/pull as needed at home for symptom maintenance    Written Home Exercises Provided: yes.  Exercises were reviewed and Dulce was able to demonstrate them prior to the end of the session.  Dulce demonstrated good  understanding of the education provided.     See EMR under Patient Instructions for exercises provided initial eval, 11/9/20, 11/30/2020.    Assessment   Pt presented with L AR of innominate today which was corrected easily with MET. Pt presented with L posterior rotation in past visits. Pt able to tolerate progressive abdominal and glut strengthening today without onset of symptom provocation. Pt with considerable tightness to B upper trapezius and increased restriction at incision. Pt responded well to addition of vacuum cupping to affected region, and will benefit from progressive myofascial release to posterior incision. Will continue to progress as able.       Dulce is progressing well towards her goals.   Pt prognosis is Good.     Pt will continue to benefit from skilled outpatient physical therapy to address the deficits listed in the problem list box on  initial evaluation, provide pt/family education and to maximize pt's level of independence in the home and community environment.     Pt's spiritual, cultural and educational needs considered and pt agreeable to plan of care and goals.     Anticipated barriers to physical therapy: none    Goals:   Short Term Goals: 4 weeks (progressing, not met)  Pt will be able to demonstrate proper upright posture without forward head without verbal cuing in order to alleviate additional stresses on spine.  Pt will be able to hold deep neck flexion for 10 sec without fatigue in order to improve upright posture  Report decreased neck and back pain < / = 5 /10 at worst to increase tolerance for ADLs  Pt will be able to perform bridging x 5 without pain and clearing the gluts to improve standing tolerance   Pt will be able to demonstrate a proper TA contraction in order to provide stability in lumbar spine with functional tasks.  Pt to tolerate HEP to improve ROM and independence with ADL's     Long Term Goals: 8 weeks  (progressing, not met)  Increased cervical B sidebending and rotation AROM by > / = 10 degrees in order to tolerate driving  Increased MMT For B hip extension and abduction by 1 grade to increase tolerance for ADLs   Pt will report > or = 20% decrease in difficulty performing household chores in order to improve functional mobility.   Pt will increase B trunk rotation AROM by 10% in order to tolerate performing household chores without increase in pain.  Pt will be able to demonstrate proper lifting mechanics with light weights, without cueing in order to prevent future injuries.   Pt will be independent in HEP and symptom management      Plan     Continue with current POC toward established therapy goals.   (possible for next visit: brueggers,  scap retraction/rows,  BKFO, DKTC w ball, bridge, supine vs SL clams)    Stephanie Burns, PT

## 2020-12-01 RX ORDER — ROSUVASTATIN CALCIUM 10 MG/1
10 TABLET, COATED ORAL NIGHTLY
Qty: 90 TABLET | Refills: 1 | Status: SHIPPED | OUTPATIENT
Start: 2020-12-01 | End: 2021-03-02 | Stop reason: SDUPTHER

## 2020-12-02 ENCOUNTER — HOSPITAL ENCOUNTER (OUTPATIENT)
Dept: RADIOLOGY | Facility: HOSPITAL | Age: 50
Discharge: HOME | End: 2020-12-02
Attending: PHYSICIAN ASSISTANT
Payer: COMMERCIAL

## 2020-12-02 DIAGNOSIS — M54.9 DORSALGIA, UNSPECIFIED: ICD-10-CM

## 2020-12-02 DIAGNOSIS — G89.29 CHRONIC BILATERAL LOW BACK PAIN WITH LEFT-SIDED SCIATICA: ICD-10-CM

## 2020-12-02 DIAGNOSIS — M54.42 CHRONIC BILATERAL LOW BACK PAIN WITH LEFT-SIDED SCIATICA: ICD-10-CM

## 2020-12-02 PROCEDURE — 72148 MRI LUMBAR SPINE W/O DYE: CPT | Mod: TC,PO

## 2020-12-03 ENCOUNTER — OFFICE VISIT (OUTPATIENT)
Dept: PAIN MEDICINE | Facility: CLINIC | Age: 50
End: 2020-12-03
Payer: COMMERCIAL

## 2020-12-03 VITALS
TEMPERATURE: 99 F | DIASTOLIC BLOOD PRESSURE: 82 MMHG | OXYGEN SATURATION: 96 % | WEIGHT: 182.75 LBS | HEIGHT: 62 IN | BODY MASS INDEX: 33.63 KG/M2 | HEART RATE: 68 BPM | SYSTOLIC BLOOD PRESSURE: 143 MMHG

## 2020-12-03 DIAGNOSIS — M54.50 CHRONIC LEFT-SIDED LOW BACK PAIN WITHOUT SCIATICA: ICD-10-CM

## 2020-12-03 DIAGNOSIS — M47.816 LUMBAR SPONDYLOSIS: Primary | ICD-10-CM

## 2020-12-03 DIAGNOSIS — Z11.9 SCREENING EXAMINATION FOR INFECTIOUS DISEASE: ICD-10-CM

## 2020-12-03 DIAGNOSIS — G89.29 CHRONIC LEFT-SIDED LOW BACK PAIN WITHOUT SCIATICA: ICD-10-CM

## 2020-12-03 DIAGNOSIS — G95.9 CERVICAL MYELOPATHY WITH CERVICAL RADICULOPATHY: ICD-10-CM

## 2020-12-03 DIAGNOSIS — M53.3 SACROILIAC JOINT PAIN: ICD-10-CM

## 2020-12-03 DIAGNOSIS — M54.12 CERVICAL MYELOPATHY WITH CERVICAL RADICULOPATHY: ICD-10-CM

## 2020-12-03 PROCEDURE — 99999 PR PBB SHADOW E&M-EST. PATIENT-LVL IV: CPT | Mod: PBBFAC,,, | Performed by: ANESTHESIOLOGY

## 2020-12-03 PROCEDURE — 1125F AMNT PAIN NOTED PAIN PRSNT: CPT | Mod: S$GLB,,, | Performed by: ANESTHESIOLOGY

## 2020-12-03 PROCEDURE — 3008F BODY MASS INDEX DOCD: CPT | Mod: CPTII,S$GLB,, | Performed by: ANESTHESIOLOGY

## 2020-12-03 PROCEDURE — 1125F PR PAIN SEVERITY QUANTIFIED, PAIN PRESENT: ICD-10-PCS | Mod: S$GLB,,, | Performed by: ANESTHESIOLOGY

## 2020-12-03 PROCEDURE — 3008F PR BODY MASS INDEX (BMI) DOCUMENTED: ICD-10-PCS | Mod: CPTII,S$GLB,, | Performed by: ANESTHESIOLOGY

## 2020-12-03 PROCEDURE — 99214 OFFICE O/P EST MOD 30 MIN: CPT | Mod: S$GLB,,, | Performed by: ANESTHESIOLOGY

## 2020-12-03 PROCEDURE — 99214 PR OFFICE/OUTPT VISIT, EST, LEVL IV, 30-39 MIN: ICD-10-PCS | Mod: S$GLB,,, | Performed by: ANESTHESIOLOGY

## 2020-12-03 PROCEDURE — 99999 PR PBB SHADOW E&M-EST. PATIENT-LVL IV: ICD-10-PCS | Mod: PBBFAC,,, | Performed by: ANESTHESIOLOGY

## 2020-12-03 RX ORDER — SODIUM CHLORIDE, SODIUM LACTATE, POTASSIUM CHLORIDE, CALCIUM CHLORIDE 600; 310; 30; 20 MG/100ML; MG/100ML; MG/100ML; MG/100ML
INJECTION, SOLUTION INTRAVENOUS CONTINUOUS
Status: CANCELLED | OUTPATIENT
Start: 2020-12-15

## 2020-12-03 NOTE — LETTER
December 3, 2020      Daisy Pettit PA-C  [address] [facility] [address]  [address]  [facility] [address]           [facility]  [address] [place] [address]  Phone: [phone]  Fax: [phone]          Patient: Dulce Mobley   MR Number: [id number]   YOB: 1970   Date of Visit: 12/3/2020       Dear Daisy Pettit:    Thank you for referring Dulce Mobley to me for evaluation. Attached you will find relevant portions of my assessment and plan of care.    If you have questions, please do not hesitate to call me. I look forward to following Dulce Mobley along with you.    Sincerely,    Jerome Cerrato MD    Enclosure  CC:  No Recipients    If you would like to receive this communication electronically, please contact [email or web address] or [phone] to request more information on [employer] Link access.    For providers and/or their staff who would like to refer a patient to [facility], please contact us through our one-stop-shop provider referral line, [facility], at [phone].    If you feel you have received this communication in error or would no longer like to receive these types of communications, please e-mail [email or web address]

## 2020-12-03 NOTE — PROGRESS NOTES
[facility] New Patient Evaluation    Referred by: Daisy Pettit PA-C  Reason for referral: neck and back pain    CC:   Chief Complaint   Patient presents with    Low-back Pain    Leg Pain     left      Last 3 PDI Scores 12/3/2020   Pain Disability Index (PDI) 21       HPI:   Dulce Mobley is a 50 y.o. female who complains of neck and back pain    Onset: 8 months  Progression: since onset, pain is stable  Typical Range: 2-10/10  Timing: constant  Quality:  Aching, sharp, throbing  Radiation: yes, down left leg to the shin  Associated numbness or weakness: no numbness, no weakness   Exacerbated by: sitting, standing, walking, back extension  Allievated by: nothing  Is Pain Level Acceptable?: No    Previous Therapies:  PT/OT: yes. currently  HEP: yes  Interventions:   Surgery:  She has had 2 cervical spine surgeries - January 2007 and March 2020 ([place]).  Her last surgery was 3-9-20 of posterior decompression and fustion, C3/4, C5/6, C6/7, C7/T1.  Medications:   - NSAIDS: ibuprofen  - MSK Relaxants: flexeril  - TCAs:   - SNRIs:   - Topicals:   - Anticonvulsants: gabapentin  - Opioids:     History:    Current Outpatient Medications:     bethanechol (URECHOLINE) 25 MG Tab, bethanechol chloride 25 mg tablet  Take 1 tablet 4 times a day by oral route., Disp: , Rfl:     cyclobenzaprine (FLEXERIL) 5 MG tablet, Take 1 tablet (5 mg total) by mouth every 12 (twelve) hours as needed for Muscle spasms., Disp: 40 tablet, Rfl: 0    docusate sodium (COLACE) 100 MG capsule, Take 100 mg by mouth once daily., Disp: , Rfl:     famotidine (PEPCID) 40 MG tablet, Take 1 tablet (40 mg total) by mouth nightly., Disp: 30 tablet, Rfl: 2    gabapentin (NEURONTIN) 300 MG capsule, gabapentin 300 mg capsule  Take 1 capsule 3 times a day by oral route., Disp: , Rfl:     levothyroxine (SYNTHROID) 88 MCG tablet, levothyroxine 88 mcg tablet  1 TAB PO QAM on empty stomach  No food/drink/med x 1 hour; no vitamins x 4 hours.,  Disp: , Rfl:     pantoprazole (PROTONIX) 40 MG tablet, pantoprazole 40 mg tablet,delayed release  Take 1 tablet every day by oral route., Disp: , Rfl:     rosuvastatin (CRESTOR) 10 MG tablet, Take 1 tablet (10 mg total) by mouth every evening., Disp: 90 tablet, Rfl: 1    diazePAM (VALIUM) 10 MG Tab, Take 1 tablet (10 mg total) by mouth once. 30 minutes prior to MRI. for 1 dose, Disp: 1 tablet, Rfl: 0    Past Medical History:   Diagnosis Date    Cervical radiculopathy     Chronic low back pain     Chronic neck pain     Gastroparesis     GERD (gastroesophageal reflux disease)     Hip bursitis     History of gestational diabetes     HLD (hyperlipidemia)     Hypothyroidism     Sciatica of left side        Past Surgical History:   Procedure Laterality Date    CERVICAL FUSION      x2     SECTION      COLONOSCOPY  2018    done in [place]; unremarkable per pt report    SALPINGECTOMY      SHOULDER SURGERY Right     TOTAL ABDOMINAL HYSTERECTOMY      UPPER GASTROINTESTINAL ENDOSCOPY  2020    done in [place]; unremarkable per pt report    UPPER GASTROINTESTINAL ENDOSCOPY         Family History   Problem Relation Age of Onset    Diabetes Mother     Hypertension Mother     Diabetes Father     Hypertension Father     Colon cancer Neg Hx        Social History     Socioeconomic History    Marital status:      Spouse name: Not on file    Number of children: Not on file    Years of education: Not on file    Highest education level: Not on file   Occupational History    Not on file   Social Needs    Financial resource strain: Not on file    Food insecurity     Worry: Not on file     Inability: Not on file    Transportation needs     Medical: Not on file     Non-medical: Not on file   Tobacco Use    Smoking status: Former Smoker     Types: Cigarettes    Smokeless tobacco: Never Used    Tobacco comment: Quit in    Substance and Sexual Activity    Alcohol use: Yes     Frequency:  "Monthly or less     Drinks per session: 1 or 2     Binge frequency: Never    Drug use: Never    Sexual activity: Yes     Partners: Male     Birth control/protection: See Surgical Hx     Comment: occ.   Lifestyle    Physical activity     Days per week: Not on file     Minutes per session: Not on file    Stress: Not on file   Relationships    Social connections     Talks on phone: Not on file     Gets together: Not on file     Attends Restorationism service: Not on file     Active member of club or organization: Not on file     Attends meetings of clubs or organizations: Not on file     Relationship status: Not on file   Other Topics Concern    Not on file   Social History Narrative    Not currently working, recently moved from [place]       Review of patient's allergies indicates:   Allergen Reactions    Clarithromycin Rash    Penicillins Rash       Review of Systems:  General ROS: negative for - fever  Psychological ROS: negative for - hallucinations  Hematological and Lymphatic ROS: negative for - bleeding problems  Endocrine ROS: negative for - unexpected weight changes  Respiratory ROS: no cough, shortness of breath, or wheezing  Cardiovascular ROS: no chest pain or dyspnea on exertion  Gastrointestinal ROS: no abdominal pain, change in bowel habits, or black or bloody stools  Musculoskeletal ROS: negative for - muscular weakness  Neurological ROS: negative for - bowel and bladder control changes or numbness/tingling  Dermatological ROS: negative for rash    Physical Exam:  Vitals:    12/03/20 0837   BP: (!) 143/82   Pulse: 68   Temp: 98.7 °F (37.1 °C)   TempSrc: Temporal   SpO2: 96%   Weight: 82.9 kg (182 lb 12.2 oz)   Height: 5' 2" (1.575 m)   PainSc:   2   PainLoc: Back     Body mass index is 33.43 kg/m².     Gen: NAD  Psych: mood appropriate for given condition  CV: 2+ radial pulse  HEENT: anicteric   Respiratory: non labored  Abd: soft nt, nd  Skin: intact  Sensation: intact to lt touch bilaterally in " c4-t1, decreased over the left 5th digit  Reflexes: 2+ b/l Bicep, 0/0 b/l tricep, 2+ b/l BR and 1+ b/l patella Boggs negative  ROM: Cervical ROM full, shoulder, elbow and wrist ROM full  Tone:  Normal at elbow, wrist and shoulder   Inspection: no atrophy of bicep, FDI or APB noted  Palpation: tender cervical paraspinals, levator scapula and trapezius    Motor:    Right Left   C4 Shoulder Abduction  5  5   C5 Elbow Flexion    5  5   C6 Wrist Extension  5  5   C7 Elbow Extension   5  5   C8/T1 Hand Intrinsics   5  5-                 Gait: gait intact  ROM: limited AROM of the L spine in all planes, full ROM at ankles, knees and hips  Lumbar flexion 90 degrees, extension 50 degrees, side bending 30 degrees.    Sensation: intact to light touch in all dermatomes tested from L2-S1 bilaterally  Reflexes: 1+ b/l patella and 2+ b/l achilles  Palpation: Diffusely tender over lumbar paraspinals  +TTP over the left SI joint  Tone: normal in the b/l knees and hips   Skin: intact  Extremities: No edema in b/l ankles or hands  Special tests: + axial facet loading on the left, + GARRY on the left       Right Left   L2/3 Iliacus Hip flexion  5  5   L3/4 Qudratus Femoris Knee Extension  5  5   L4/5 Tib Anterior Ankle Dorsiflexion   5  5   L5/S1 Extensor Hallicus Longus Great toe extension  5  5                 S1/S2 Gastroc/Soleus Plantar Flexion  5  5     Imaging:  MRI lumbar spine 12/2/20  FINDINGS:  This report assumes that there are 5 non-rib bearing lumbar vertebral bodies with the L5 vertebral body articulating with the sacrum.  Accurate numbering of the spine levels would require imaging of the thoracolumbar spine to count ribs.     The prevertebral soft tissue show an incidentally noted, partially imaged T2 hyperintense structure measuring 1.9 x 1.7 cm in the right ovary favored to represent a physiologic ovarian cyst. Alignment is anatomic. Individual vertebral height is maintained. Marrow signal is within normal limits.  Conus is normal in caliber and signal. The conus terminates at L1     At T12-L1 there is moderate disc height loss with a small to moderate broad-based posterior disc bulge. There is no facet arthropathy or ligamentum flavum hypertrophy. This causes mild spinal canal and without neural foraminal stenosis.  At L1-2, there is similar moderate disc height loss with a small broad-based posterior disc bulge. There is a tiny superimposed right paracentral disc protrusion. There is no facet arthropathy or  ligamentum flavum hypertrophy. These findings in combination cause mild spinal canal and bilateral neural foraminal stenosis.  At L2-3, the disc demonstrates normal height, signal intensity and posterior contour. The spinal canal is patent. The neural foramen is patent bilaterally. There is no ligamentum flavum thickening. There is mild bilateral facet arthropathy.  At L3-4, the disc demonstrates mild disc height loss with mild bulging of the posterior annulus at the level of the neural foramina. There is no facet arthropathy or ligamentum flavum hypertrophy. The spinal canal and neural foramina are patent.  At L4-5, the disc demonstrates relatively normal disc height and posterior contour. There is moderate bilateral facet arthropathy without ligamentum flavum hypertrophy. There are small bilateral facet joint effusions. The spinal canal and neural foramina are patent.  At L5-S1, the disc shows mild disc height loss with a small broad-based posterior disc bulge. There is a superimposed right lateral recess high intensity zone/annular fissure in the disc (sagittal image 6) with tiny right lateral recess disc protrusion. There is mild facet arthropathy without ligamentum flavum hypertrophy. The spinal canal and neural foramina appear patent.     The SI joints and visualized pelvis are within normal limits.    MRI cervical spine 11/5/20  FINDINGS:    At C2-C3, C3-C4, no narrowing.  At C4-C5, midline osteophytic ridge  results in minor central canal narrowing without neural foramen narrowing.  At C5-C6, midline osteophytic ridge results in mild central canal narrowing. Mild right neural foramen narrowing is present.  At C6-C7, broad posterior osteophytic ridge results in mild central canal narrowing with contact and slight deformity of right anterior cervical cord. Mild bilateral neural foramen narrowing is present.  At C7-T1, posterior osteophytic ridge and left uncovertebral joint osteophyte results in mild central canal narrowing with contact and deformity of left anterior cervical cord. Moderate left neural foramen narrowing is present.     Cervical alignment is normal. Extensive susceptibility artifact correlates with lateral mass screws and posterior rods transfixing C2-T1, anterior plate and screws transfixing C3-C6, and interbody graft at C6-C7 disc level transfixed anteriorly with surgical screws. Visualized vertebral bodies maintain normal bone marrow signal.     At level of C7 superior endplate, cervical cord atrophy is present with symmetric increased T2 signal within the cervical cord suggesting myelomalacia. Remainder of cervical cord is of normal caliber and contains normal signal. Visualized paraspinal soft tissues are unremarkable. Small focus of fluidlike increased T2 signal measuring up to 10 mm along prior incisional scar superficial to C7 vertebral body suggest postoperative hematoma or seroma, incidentally noted.    EMG UE's 11/19/20  Electrodiagnostic Impression:  1. 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  2. There was insufficient electrodiagnostic evidence for diagnoses of peripheral polyneuropathy, myopathy, or active axonal cervical radiculopathy/brachial plexopathy of the left upper extremity.    Labs:  BMP  Lab Results   Component Value Date     11/13/2020    K 4.0 11/13/2020     11/13/2020    CO2 27 11/13/2020    BUN 15 11/13/2020     CREATININE 1.0 11/13/2020    CALCIUM 9.4 11/13/2020    ANIONGAP 9 11/13/2020    ESTGFRAFRICA >60.0 11/13/2020    EGFRNONAA >60.0 11/13/2020     Lab Results   Component Value Date    ALT 16 11/13/2020    AST 21 11/13/2020    ALKPHOS 138 (H) 11/13/2020    BILITOT 0.4 11/13/2020       Assessment:   Problem List Items Addressed This Visit        Orthopedic    Chronic low back pain      Other Visit Diagnoses     Lumbar spondylosis    -  Primary    Screening examination for infectious disease        Relevant Orders    COVID-19 Routine Screening    Sacroiliac joint pain        Cervical myelopathy with cervical radiculopathy              50 y.o. year old female with PMH GERD, hypothyroidism presents to the office with neck and back pain.  She has had 2 cervical spine surgeries - January 2007 and March 2020 ([place]).  Her last surgery was 3-9-20 of posterior decompression and fusion, C3/4, C5/6, C6/7, C7/T1 was for cervical cord compression with myelopathy.  She reports that prior to her neck surgery she was having left arm pain and numbness and following surgery the pain improved but her numbness persisted.  Today she reports she has had some return of the pain into her left 5th digit.  She also has lower back pain for the past 8 months on the left side of her back and buttock.  Sometimes she gets aching into her left shin.  Denies any gait changes or bowel/bladder dysfunction.  On exam she has 5-/5 left  strength, decreased sensation over the left 5th digit, 2+ b/l Bicep, 0/0 b/l tricep, 2+ b/l BR, 1+ b/l patella, 2+ b/l achilles, Boggs negative.  She has + axial facet loading on the left, + TTP over the left SI joint, and + GARRY on the left.  I independently reviewed her cervical and lumbar MRI.  She has C7-T1 moderate left neural foramen narrowing and C7 cervical cord atrophy with symmetric increased T2 signal within the cervical cord suggesting myelomalacia.  In her lumbar spine I don't see any significant  central or foraminal narrowing but she does have facet arthropathy at her lower two lumbar levels.  I think her back pain is coming from her facets.  Her pain is limiting her mobility and interfering with her ADL's.  We will schedule for medial branch blocks.  In the future we can consider a cervical SHANTELLE for her recurring left arm pain, but she feels like her back is what is bothering her the most right now so in the meantime I would like her to continue with PT for her neck.  Follow up 2 weeks post injection.      Treatment Plan:   Procedures: 1st diagnostic left L4/5, L5/S1 medial branch blocks. Procedure explained using an anatomical model.  Risks, benefits, alternatives explained to patient who verbalized understanding, including increased risk of infection, bleeding, need for additional procedures or surgery, and nerve damage.  Questions regarding the procedure, risks, expected outcome, and possible side effects were solicited and answered to the patient's satisfaction.  Dulce wishes to proceed with the injection.  Verbal and written consent were obtained in clinic today.  PT/OT/HEP: she has completed PT for over the last 8 weeks without relief.  She will continue PT and home exercises  Medications: continue medications as prescribed, we can consider changing to pregabalin in the future  Labs: Reviewed and medications are appropriately dosed for current hepatorenal function.  Imaging: No additional recommended at this time.    : Not applicable    Jerome Cerrato M.D.  Interventional Pain Medicine / Anesthesiology

## 2020-12-04 ENCOUNTER — PATIENT MESSAGE (OUTPATIENT)
Dept: FAMILY MEDICINE | Facility: CLINIC | Age: 50
End: 2020-12-04

## 2020-12-04 DIAGNOSIS — N83.209 CYST OF OVARY, UNSPECIFIED LATERALITY: Primary | ICD-10-CM

## 2020-12-08 ENCOUNTER — TELEPHONE (OUTPATIENT)
Dept: GASTROENTEROLOGY | Facility: CLINIC | Age: 50
End: 2020-12-08

## 2020-12-08 ENCOUNTER — LAB VISIT (OUTPATIENT)
Dept: FAMILY MEDICINE | Facility: CLINIC | Age: 50
End: 2020-12-08
Payer: COMMERCIAL

## 2020-12-08 DIAGNOSIS — Z11.9 SCREENING EXAMINATION FOR INFECTIOUS DISEASE: ICD-10-CM

## 2020-12-08 PROCEDURE — U0003 INFECTIOUS AGENT DETECTION BY NUCLEIC ACID (DNA OR RNA); SEVERE ACUTE RESPIRATORY SYNDROME CORONAVIRUS 2 (SARS-COV-2) (CORONAVIRUS DISEASE [COVID-19]), AMPLIFIED PROBE TECHNIQUE, MAKING USE OF HIGH THROUGHPUT TECHNOLOGIES AS DESCRIBED BY CMS-2020-01-R: HCPCS

## 2020-12-08 NOTE — TELEPHONE ENCOUNTER
Spoke with pt. Changed appointment to virtual as her  tested positive for covid & she has to quarantine as well. Pt verbalized to updated appointment.

## 2020-12-09 LAB — SARS-COV-2 RNA RESP QL NAA+PROBE: NOT DETECTED

## 2020-12-11 DIAGNOSIS — Z13.9 SCREENING PROCEDURE: ICD-10-CM

## 2020-12-14 ENCOUNTER — TELEPHONE (OUTPATIENT)
Dept: PAIN MEDICINE | Facility: CLINIC | Age: 50
End: 2020-12-14

## 2020-12-21 ENCOUNTER — CLINICAL SUPPORT (OUTPATIENT)
Dept: REHABILITATION | Facility: HOSPITAL | Age: 50
End: 2020-12-21
Payer: COMMERCIAL

## 2020-12-21 DIAGNOSIS — R29.3 POSTURE IMBALANCE: ICD-10-CM

## 2020-12-21 DIAGNOSIS — M53.86 DECREASED RANGE OF MOTION OF LUMBAR SPINE: ICD-10-CM

## 2020-12-21 DIAGNOSIS — M62.89 MUSCLE TIGHTNESS: ICD-10-CM

## 2020-12-21 DIAGNOSIS — M62.81 MUSCLE WEAKNESS: ICD-10-CM

## 2020-12-21 DIAGNOSIS — R29.898 DECREASED RANGE OF MOTION OF NECK: ICD-10-CM

## 2020-12-21 PROCEDURE — 97110 THERAPEUTIC EXERCISES: CPT | Mod: PN

## 2020-12-21 PROCEDURE — 97140 MANUAL THERAPY 1/> REGIONS: CPT | Mod: PN

## 2020-12-21 NOTE — PROGRESS NOTES
"  Physical Therapy Treatment Note     Name: Dulce Mobley  Clinic Number: [id number]    Therapy Diagnosis:   Encounter Diagnoses   Name Primary?    Decreased range of motion of neck     Decreased range of motion of lumbar spine     Muscle weakness     Posture imbalance     Muscle tightness      Physician: Daisy Pettit PA-C    Visit Date: 12/21/2020    Physician Orders: PT Eval and Treat   Medical Diagnosis from Referral: M54.2,G89.29 (ICD-10-CM) - Chronic neck pain M54.12 (ICD-10-CM) - Cervical radiculopathy M54.42,G89.29 (ICD-10-CM) - Chronic bilateral low back pain with left-sided sciatica   Evaluation Date: 11/6/2020  Authorization Period Expiration: 12/31/2020  Plan of Care Expiration: 1/6/2021  Visit # / Visits authorized: 5 /12     Time In: 1:45  PM  Time Out: 2:35 PM   Total Billable Time: 40 minutes     Precautions: Standard    Subjective     Pt reports: her back had been feeling better until just a couple of days ago, no specific CLEOPATRA. Pt states her neck has been bothering her, she thinks it is because her bed is elevated and she is unable to lower it back down. Pt reports she hasn't been to PT because she had to quarantine.   She was compliant with home exercise program.  Response to previous treatment: some soreness  Functional change: none noted    Pain: 4/10 to the back and 5/10 to the neck   Location: neck and low back    Objective   L AR upon arrival - corrected with MET    Dulce received therapeutic exercises to develop strength, endurance, ROM, flexibility, posture and core stabilization for 30 minutes including:  Modified push/pull L AR 3 x 3"   SKTC (towel behind knee) 5 x 10 each  (NP) Supine Sciatic Nerve Glide (towel behind knee) x 10 each  Glut set hooklying 20 x 5"  Bridge GTB x 10   Supine Clam w TA set GTB 2 x 10   Hip add + pelvic tilt 20 x 5"  LTR 10 x 5" hold   Modified Push Pull 3 x 3''   Rows BTB x 20   Shld Ext BTB x 20   Brueggers GTB x 10   Lateral Walk Y loop at knees x 2 " "laps   Corner Pec Stretch 3 x 20"     (NP) Pelvic tilt 10 x 5" - reports discomfort to the low back  (NP) Supine Cervical Rotation with towel roll x 10 each      Dulce received the following manual therapy techniques: Soft tissue Mobilization were applied to the: neck for 10  minutes, including:  STM along B UT and cervical paraspinals in supine   (NP) Vacuum/cupping STM with manual therapy techniques was performed to B UT and at distal posterior cervical incision to decrease muscle tightness, increase circulation and promote healing process.  The pt's skin was monitored for redness adjusting pressure as needed. The pt was instructed in possible side effects of bruising and/or soreness.        Home Exercises Provided and Patient Education Provided     Education provided:   - importance of compliance to HEP for improved strength and stabilization of core and hips  - expectation for soreness and fluctuation of symptoms as pelvis stabilizes  - emphasis on push/pull as needed at home for symptom maintenance    Written Home Exercises Provided: yes.  Exercises were reviewed and Dulce was able to demonstrate them prior to the end of the session.  Dulce demonstrated good  understanding of the education provided.     See EMR under Patient Instructions for exercises provided initial eval, 11/9/20, 11/30/2020.    Assessment   Pt presented with L AR of innominate today which was corrected easily with MET. Pt displayed fair tolerance for treatment session today, with increased muscular fatigue and soreness due to prolonged time since prior visit. Pt able to progress cervical and lumbar stabilization exercises with increased ease. Will reassess and progress next visit.       Dulce is progressing well towards her goals.   Pt prognosis is Good.     Pt will continue to benefit from skilled outpatient physical therapy to address the deficits listed in the problem list box on initial evaluation, provide pt/family education and to " maximize pt's level of independence in the home and community environment.     Pt's spiritual, cultural and educational needs considered and pt agreeable to plan of care and goals.     Anticipated barriers to physical therapy: none    Goals:   Short Term Goals: 4 weeks (progressing, not met)  Pt will be able to demonstrate proper upright posture without forward head without verbal cuing in order to alleviate additional stresses on spine.  Pt will be able to hold deep neck flexion for 10 sec without fatigue in order to improve upright posture  Report decreased neck and back pain < / = 5 /10 at worst to increase tolerance for ADLs  Pt will be able to perform bridging x 5 without pain and clearing the gluts to improve standing tolerance   Pt will be able to demonstrate a proper TA contraction in order to provide stability in lumbar spine with functional tasks.  Pt to tolerate HEP to improve ROM and independence with ADL's     Long Term Goals: 8 weeks  (progressing, not met)  Increased cervical B sidebending and rotation AROM by > / = 10 degrees in order to tolerate driving  Increased MMT For B hip extension and abduction by 1 grade to increase tolerance for ADLs   Pt will report > or = 20% decrease in difficulty performing household chores in order to improve functional mobility.   Pt will increase B trunk rotation AROM by 10% in order to tolerate performing household chores without increase in pain.  Pt will be able to demonstrate proper lifting mechanics with light weights, without cueing in order to prevent future injuries.   Pt will be independent in HEP and symptom management      Plan     Continue with current POC toward established therapy goals.   (possible for next visit: brueggers,  scap retraction/rows,  BKFO, DKTC w ball, bridge, supine vs SL clams)    Stephanie Burns, PT

## 2020-12-23 ENCOUNTER — CLINICAL SUPPORT (OUTPATIENT)
Dept: REHABILITATION | Facility: HOSPITAL | Age: 50
End: 2020-12-23
Payer: COMMERCIAL

## 2020-12-23 DIAGNOSIS — R29.3 POSTURE IMBALANCE: ICD-10-CM

## 2020-12-23 DIAGNOSIS — M62.81 MUSCLE WEAKNESS: ICD-10-CM

## 2020-12-23 DIAGNOSIS — R29.898 DECREASED RANGE OF MOTION OF NECK: ICD-10-CM

## 2020-12-23 DIAGNOSIS — M53.86 DECREASED RANGE OF MOTION OF LUMBAR SPINE: ICD-10-CM

## 2020-12-23 DIAGNOSIS — M62.89 MUSCLE TIGHTNESS: ICD-10-CM

## 2020-12-23 PROCEDURE — 97110 THERAPEUTIC EXERCISES: CPT | Mod: PN

## 2020-12-23 PROCEDURE — 97140 MANUAL THERAPY 1/> REGIONS: CPT | Mod: PN

## 2020-12-23 NOTE — PROGRESS NOTES
Physical Therapy Treatment Note     Name: Dulce Mobley  [facility] Number: [id number]    Therapy Diagnosis:   Encounter Diagnoses   Name Primary?    Decreased range of motion of neck     Decreased range of motion of lumbar spine     Muscle weakness     Posture imbalance     Muscle tightness      Physician: Daisy Pettit PA-C    Visit Date: 12/23/2020    Physician Orders: PT Eval and Treat   Medical Diagnosis from Referral: M54.2,G89.29 (ICD-10-CM) - Chronic neck pain M54.12 (ICD-10-CM) - Cervical radiculopathy M54.42,G89.29 (ICD-10-CM) - Chronic bilateral low back pain with left-sided sciatica   Evaluation Date: 11/6/2020  Authorization Period Expiration: 12/31/2020  Plan of Care Expiration: 1/6/2021  Visit # / Visits authorized: 5 /12     Time In: 10:05 AM  Time Out: 10:45  Total Billable Time: 40 minutes     Precautions: Standard    Subjective     Pt reports: she continues to have increased stiffness in her back with difficulty getting up after prolonged sitting. Pt reports her neck causes increased difficulty when sleeping.   She was compliant with home exercise program.  Response to previous treatment: some soreness  Functional change: none noted    Pain: 3/10 to the back and 0/10 to the neck  Worst: 8/10 in the back 6/10 in the neck   Location: neck and low back    Objective   L AR upon arrival - corrected with MET    Posture Alignment : forward head and protracted shoulders in sitting, In standing, pt with L posterior rotation of innominate, forward head, rounded shoulders.      Movement Analysis: increased time and effort with bed mobility, transitioning from side-lying, prone, supine and returning to sitting.      GAIT DEVIATIONS: Dulce displays no noticeable gait deviations.      ROM:  Cervical Range of Motion:     Degrees   Flexion 30   Extension 31   Right Rotation 50   Left Rotation 40   Right Side Bending 24*   Left Side Bending 22*      Shoulder A/PROM within functional limits with pain at end  "range.      Lumbar Range of Motion:   AROM ROM (% of normal) Normal   Flexion: 85% 60 deg   Extension: 25% 25 deg   Lateral Flex R: 50% 25 deg   Lateral Flex L: 50% 25 deg   Rotation R: 75% 18 deg   Rotation L: 75% 18 deg   *denotes pain     Strength:    Right Left Comment   Shoulder flexion: 5/5 5/5     Shoulder Abduction: 5/5 5/5     Elbow Flexion: 5/5 5/5     Elbow Extension: 5/5 5/5      5/5 5/5             Right Left Comment   Hip Flexion: 5/5 5/5     Hip ABD: 4+/5 5/5     Hip ADD: 5/5 5/5     Hip Extension: 4+/5 4+/5     Knee Ext: 5/5 5/5     Knee Flex: 4/5 5/5     Ankle DF: BLE 5/5     Neurovascular:  - DTR: Patellar: BLE2+ Achilles BLE 2+  - Sensation: grossly intact light touch across BUE and BLE  - Neural Tension Slump: R (-) L (+)  - Boggs's Sign: BUE negative  - Clonus: BLE negative     Palpation: Pt TTP at L PSIS, mild tightness throughout lumbar paraspinals      Joint Play:  Recreation of LLE pain with CPA of L2-L4 spinous processes, unable to accurately assess joint mobility    Dulce received therapeutic exercises to develop strength, endurance, ROM, flexibility, posture and core stabilization for 30 minutes including:  Modified push/pull L AR 3 x 3"   Glut set hooklying 20 x 5"  Supine Sciatic Nerve Glide (towel behind knee) x 10 each  Bridge Y loop 2 x 10   SL Clam Y Loop 2 x 10   Hip add + pelvic tilt 20 x 5"  TA Set with ball 10 x 5" hold   LTR 10 x 5" hold   Modified Push Pull 3 x 3''   Rows BTB x 20   Shld Ext BTB x 20   Brueggers GTB x 10       Not Performed:   SKTC (towel behind knee) 5 x 10 each  Lateral Walk Y loop at knees x 2 laps   Corner Pec Stretch 3 x 20"   (NP) Pelvic tilt 10 x 5" - reports discomfort to the low back  (NP) Supine Cervical Rotation with towel roll x 10 each      Dulce received the following manual therapy techniques: Soft tissue Mobilization were applied to the: neck for 10  minutes, including:  STM along B UT and cervical paraspinals in supine   (NP) " Vacuum/cupping STM with manual therapy techniques was performed to B UT and at distal posterior cervical incision to decrease muscle tightness, increase circulation and promote healing process.  The pt's skin was monitored for redness adjusting pressure as needed. The pt was instructed in possible side effects of bruising and/or soreness.        Home Exercises Provided and Patient Education Provided     Education provided:   - importance of compliance to HEP for improved strength and stabilization of core and hips  - expectation for soreness and fluctuation of symptoms as pelvis stabilizes  - emphasis on push/pull as needed at home for symptom maintenance    Written Home Exercises Provided: yes.  Exercises were reviewed and Dulce was able to demonstrate them prior to the end of the session.  Dulce demonstrated good  understanding of the education provided.     See EMR under Patient Instructions for exercises provided initial eval, 11/9/20, 11/30/2020.    Assessment   Pt demonstrated reduced lumbar and cervical mobility since beginning therapy services; however displayed considerable improvements in lumbopelvic and LE kinetic chain strength. Pt continues with radicular symptoms into the LLE and SI dysfunction, which is corrected with MET. Pt able to progress therex slightly today to focus on increased muscle endurance and lumbopelvic control and stability. Pt will continue to benefit from skilled PT services to reduce radicular symptoms, improve lumbar stability, and resolve upper cross muscle imbalances; so he may maximize independence in ADLs.       Dulce is progressing well towards her goals.   Pt prognosis is Good.     Pt will continue to benefit from skilled outpatient physical therapy to address the deficits listed in the problem list box on initial evaluation, provide pt/family education and to maximize pt's level of independence in the home and community environment.     Pt's spiritual, cultural and  educational needs considered and pt agreeable to plan of care and goals.     Anticipated barriers to physical therapy: none    Goals:   Short Term Goals: 4 weeks (progressing, not met)  Pt will be able to demonstrate proper upright posture without forward head without verbal cuing in order to alleviate additional stresses on spine.  Pt will be able to hold deep neck flexion for 10 sec without fatigue in order to improve upright posture  Report decreased neck and back pain < / = 5 /10 at worst to increase tolerance for ADLs  Pt will be able to perform bridging x 5 without pain and clearing the gluts to improve standing tolerance  MET  Pt will be able to demonstrate a proper TA contraction in order to provide stability in lumbar spine with functional tasks. MET  Pt to tolerate HEP to improve ROM and independence with ADL's     Long Term Goals: 8 weeks  (progressing, not met)  Increased cervical B sidebending and rotation AROM by > / = 10 degrees in order to tolerate driving  Increased MMT For B hip extension and abduction by 1 grade to increase tolerance for ADLs   Pt will report > or = 20% decrease in difficulty performing household chores in order to improve functional mobility.   Pt will increase B trunk rotation AROM by 10% in order to tolerate performing household chores without increase in pain.  Pt will be able to demonstrate proper lifting mechanics with light weights, without cueing in order to prevent future injuries.   Pt will be independent in HEP and symptom management      Plan     Continue with current POC toward established therapy goals.       Stephanie Burns, PT

## 2020-12-30 ENCOUNTER — CLINICAL SUPPORT (OUTPATIENT)
Dept: REHABILITATION | Facility: HOSPITAL | Age: 50
End: 2020-12-30
Payer: COMMERCIAL

## 2020-12-30 ENCOUNTER — TELEPHONE (OUTPATIENT)
Dept: GASTROENTEROLOGY | Facility: CLINIC | Age: 50
End: 2020-12-30

## 2020-12-30 DIAGNOSIS — R29.3 POSTURE IMBALANCE: ICD-10-CM

## 2020-12-30 DIAGNOSIS — M62.89 MUSCLE TIGHTNESS: ICD-10-CM

## 2020-12-30 DIAGNOSIS — M53.86 DECREASED RANGE OF MOTION OF LUMBAR SPINE: ICD-10-CM

## 2020-12-30 DIAGNOSIS — M62.81 MUSCLE WEAKNESS: ICD-10-CM

## 2020-12-30 DIAGNOSIS — R29.898 DECREASED RANGE OF MOTION OF NECK: ICD-10-CM

## 2020-12-30 PROCEDURE — 97110 THERAPEUTIC EXERCISES: CPT | Mod: PN

## 2020-12-30 PROCEDURE — 97140 MANUAL THERAPY 1/> REGIONS: CPT | Mod: PN

## 2020-12-30 NOTE — PROGRESS NOTES
"  Physical Therapy Treatment Note     Name: Dulce Mobley  Clinic Number: [id number]    Therapy Diagnosis:   Encounter Diagnoses   Name Primary?    Decreased range of motion of neck     Decreased range of motion of lumbar spine     Muscle weakness     Posture imbalance     Muscle tightness      Physician: Daisy Pettit PA-C    Visit Date: 12/30/2020    Physician Orders: PT Eval and Treat   Medical Diagnosis from Referral: M54.2,G89.29 (ICD-10-CM) - Chronic neck pain M54.12 (ICD-10-CM) - Cervical radiculopathy M54.42,G89.29 (ICD-10-CM) - Chronic bilateral low back pain with left-sided sciatica   Evaluation Date: 11/6/2020  Authorization Period Expiration: 12/31/2020  Plan of Care Expiration: 1/6/2021  Visit # / Visits authorized: 7 /12     Time In: 1:50 PM  Time Out: 2:40 AM  Total Billable Time: 40 minutes     Precautions: Standard    Subjective     Pt reports: her neck just started hurting a few minutes ago, her back is still giving her trouble.   She was compliant with home exercise program.  Response to previous treatment: some soreness  Functional change: none noted    Pain: 2/10 to the back and 4/10 to the neck  Worst: 8/10 in the back 6/10 in the neck   Location: neck and low back    Objective   L AR upon arrival - corrected with MET    Dulce received the following manual therapy techniques: Soft tissue Mobilization were applied to the: neck for 10  minutes, including:  STM along B UT and cervical paraspinals in supine   KT tape: 4.5" I-strip with 50% stretch to bilateral cervical paraspinals.   Pt denies adhesive or latex allergies, and pt. was educated regarding potential adverse effects of Kinesio Tape.      Dulce received therapeutic exercises to develop strength, endurance, ROM, flexibility, posture and core stabilization for 30 minutes including:  Modified push/pull L AR 3 x 3"   Glut set hooklying 20 x 5"  Supine Sciatic Nerve Glide (towel behind knee) x 10 each  Bridge Y loop 2 x 10   SL Clam Y " "Loop 2 x 10   Hip add + pelvic tilt 20 x 5"  TA Set with ball 10 x 5" hold   Bridge with TA pulldown BTB x 10   LTR 10 x 5" hold   Modified Push Pull 3 x 3''   Lateral Walk Y loop at knees x 2 laps   Rows BTB x 20   Shld Ext BTB x 20   Brueggers GTB x 10       Not Performed:   SKTC (towel behind knee) 5 x 10 each  Corner Pec Stretch 3 x 20"   (NP) Pelvic tilt 10 x 5" - reports discomfort to the low back  (NP) Supine Cervical Rotation with towel roll x 10 each    Home Exercises Provided and Patient Education Provided     Education provided:   - importance of compliance to HEP for improved strength and stabilization of core and hips  - expectation for soreness and fluctuation of symptoms as pelvis stabilizes  - emphasis on push/pull as needed at home for symptom maintenance    Written Home Exercises Provided: yes.  Exercises were reviewed and Dulce was able to demonstrate them prior to the end of the session.  Dulce demonstrated good  understanding of the education provided.     See EMR under Patient Instructions for exercises provided initial eval, 11/9/20, 11/30/2020.    Assessment   Pt demonstrated good tolerance for treatment session today, continues to present with SIJ dysfunction and educated on importance of MET throughout the day. Pt able to progress therex slightly to include increased co-activation of lumbopelvic stabilizers. KT tape added to cervical paraspinals for muscle inhibition and improved scar mobility. Will continue to progress as able.    Dulce is progressing well towards her goals.   Pt prognosis is Good.     Pt will continue to benefit from skilled outpatient physical therapy to address the deficits listed in the problem list box on initial evaluation, provide pt/family education and to maximize pt's level of independence in the home and community environment.     Pt's spiritual, cultural and educational needs considered and pt agreeable to plan of care and goals.     Anticipated barriers to " physical therapy: none    Goals:   Short Term Goals: 4 weeks (progressing, not met)  Pt will be able to demonstrate proper upright posture without forward head without verbal cuing in order to alleviate additional stresses on spine.  Pt will be able to hold deep neck flexion for 10 sec without fatigue in order to improve upright posture  Report decreased neck and back pain < / = 5 /10 at worst to increase tolerance for ADLs  Pt will be able to perform bridging x 5 without pain and clearing the gluts to improve standing tolerance  MET  Pt will be able to demonstrate a proper TA contraction in order to provide stability in lumbar spine with functional tasks. MET  Pt to tolerate HEP to improve ROM and independence with ADL's     Long Term Goals: 8 weeks  (progressing, not met)  Increased cervical B sidebending and rotation AROM by > / = 10 degrees in order to tolerate driving  Increased MMT For B hip extension and abduction by 1 grade to increase tolerance for ADLs   Pt will report > or = 20% decrease in difficulty performing household chores in order to improve functional mobility.   Pt will increase B trunk rotation AROM by 10% in order to tolerate performing household chores without increase in pain.  Pt will be able to demonstrate proper lifting mechanics with light weights, without cueing in order to prevent future injuries.   Pt will be independent in HEP and symptom management      Plan     Continue with current POC toward established therapy goals.       Stephanie Burns, PT

## 2020-12-30 NOTE — TELEPHONE ENCOUNTER
----- Message from Teresa Gracia sent at 12/30/2020 12:09 PM CST -----  Contact: self  Type:  Sooner Apoointment Request    Caller is requesting a sooner appointment.  Caller declined first available appointment listed below.  Caller will not accept being placed on the waitlist and is requesting a message be sent to doctor.    Name of Caller:  patient   When is the first available appointment?  03/08/2021  Symptoms:  stomach issues  Best Call Back Number: [phone] (home)     Additional Information:  offered appointment with NP on 01/8/2020, patient requesting to see doctor only, reschedule form 12/17/2020

## 2020-12-30 NOTE — TELEPHONE ENCOUNTER
Spoke with pt. Pt has never been seen in office & wanted to be seen by MD only, pt refused to be seen for first visit by NP. Pt did not want to wait for any of [facility] providers office visit dates. Pt scheduled with provider in [place] & verbalized understanding.

## 2021-01-06 ENCOUNTER — HOSPITAL ENCOUNTER (OUTPATIENT)
Dept: RADIOLOGY | Facility: HOSPITAL | Age: 51
Discharge: HOME | End: 2021-01-06
Attending: INTERNAL MEDICINE
Payer: COMMERCIAL

## 2021-01-06 DIAGNOSIS — Z12.31 SCREENING MAMMOGRAM, ENCOUNTER FOR: ICD-10-CM

## 2021-01-06 PROCEDURE — 77067 SCR MAMMO BI INCL CAD: CPT | Mod: 26,,, | Performed by: RADIOLOGY

## 2021-01-06 PROCEDURE — 77067 MAMMO DIGITAL SCREENING BILAT WITH TOMO: ICD-10-PCS | Mod: 26,,, | Performed by: RADIOLOGY

## 2021-01-06 PROCEDURE — 77063 BREAST TOMOSYNTHESIS BI: CPT | Mod: 26,,, | Performed by: RADIOLOGY

## 2021-01-06 PROCEDURE — 77063 MAMMO DIGITAL SCREENING BILAT WITH TOMO: ICD-10-PCS | Mod: 26,,, | Performed by: RADIOLOGY

## 2021-01-06 PROCEDURE — 77067 SCR MAMMO BI INCL CAD: CPT | Mod: TC,PO

## 2021-01-08 ENCOUNTER — CLINICAL SUPPORT (OUTPATIENT)
Dept: REHABILITATION | Facility: HOSPITAL | Age: 51
End: 2021-01-08
Payer: COMMERCIAL

## 2021-01-08 DIAGNOSIS — M62.81 MUSCLE WEAKNESS: ICD-10-CM

## 2021-01-08 DIAGNOSIS — M62.89 MUSCLE TIGHTNESS: ICD-10-CM

## 2021-01-08 DIAGNOSIS — M53.86 DECREASED RANGE OF MOTION OF LUMBAR SPINE: ICD-10-CM

## 2021-01-08 DIAGNOSIS — R29.898 DECREASED RANGE OF MOTION OF NECK: ICD-10-CM

## 2021-01-08 DIAGNOSIS — R29.3 POSTURE IMBALANCE: ICD-10-CM

## 2021-01-08 PROCEDURE — 97110 THERAPEUTIC EXERCISES: CPT | Mod: PN

## 2021-01-08 PROCEDURE — 97140 MANUAL THERAPY 1/> REGIONS: CPT | Mod: PN

## 2021-01-12 ENCOUNTER — CLINICAL SUPPORT (OUTPATIENT)
Dept: REHABILITATION | Facility: HOSPITAL | Age: 51
End: 2021-01-12
Payer: COMMERCIAL

## 2021-01-12 DIAGNOSIS — M62.81 MUSCLE WEAKNESS: ICD-10-CM

## 2021-01-12 DIAGNOSIS — R29.3 POSTURE IMBALANCE: ICD-10-CM

## 2021-01-12 DIAGNOSIS — R29.898 DECREASED RANGE OF MOTION OF NECK: ICD-10-CM

## 2021-01-12 DIAGNOSIS — M53.86 DECREASED RANGE OF MOTION OF LUMBAR SPINE: ICD-10-CM

## 2021-01-12 DIAGNOSIS — M62.89 MUSCLE TIGHTNESS: ICD-10-CM

## 2021-01-12 PROCEDURE — 97110 THERAPEUTIC EXERCISES: CPT | Mod: PN

## 2021-01-12 PROCEDURE — 97140 MANUAL THERAPY 1/> REGIONS: CPT | Mod: PN

## 2021-01-13 ENCOUNTER — OFFICE VISIT (OUTPATIENT)
Dept: GASTROENTEROLOGY | Facility: CLINIC | Age: 51
End: 2021-01-13
Payer: COMMERCIAL

## 2021-01-13 VITALS — HEIGHT: 62 IN | BODY MASS INDEX: 33.68 KG/M2 | WEIGHT: 183 LBS

## 2021-01-13 DIAGNOSIS — Z01.818 PREOP TESTING: ICD-10-CM

## 2021-01-13 DIAGNOSIS — R19.4 CHANGE IN BOWEL HABITS: ICD-10-CM

## 2021-01-13 DIAGNOSIS — R10.9 ABDOMINAL PAIN, UNSPECIFIED ABDOMINAL LOCATION: Primary | ICD-10-CM

## 2021-01-13 DIAGNOSIS — R10.11 RUQ PAIN: ICD-10-CM

## 2021-01-13 DIAGNOSIS — K21.9 GASTROESOPHAGEAL REFLUX DISEASE, UNSPECIFIED WHETHER ESOPHAGITIS PRESENT: ICD-10-CM

## 2021-01-13 DIAGNOSIS — K59.00 CONSTIPATION, UNSPECIFIED CONSTIPATION TYPE: ICD-10-CM

## 2021-01-13 PROCEDURE — 99999 PR PBB SHADOW E&M-EST. PATIENT-LVL III: CPT | Mod: PBBFAC,,, | Performed by: INTERNAL MEDICINE

## 2021-01-13 PROCEDURE — 1125F AMNT PAIN NOTED PAIN PRSNT: CPT | Mod: S$GLB,,, | Performed by: INTERNAL MEDICINE

## 2021-01-13 PROCEDURE — 1125F PR PAIN SEVERITY QUANTIFIED, PAIN PRESENT: ICD-10-PCS | Mod: S$GLB,,, | Performed by: INTERNAL MEDICINE

## 2021-01-13 PROCEDURE — 99214 OFFICE O/P EST MOD 30 MIN: CPT | Mod: S$GLB,,, | Performed by: INTERNAL MEDICINE

## 2021-01-13 PROCEDURE — 3008F BODY MASS INDEX DOCD: CPT | Mod: CPTII,S$GLB,, | Performed by: INTERNAL MEDICINE

## 2021-01-13 PROCEDURE — 99999 PR PBB SHADOW E&M-EST. PATIENT-LVL III: ICD-10-PCS | Mod: PBBFAC,,, | Performed by: INTERNAL MEDICINE

## 2021-01-13 PROCEDURE — 3008F PR BODY MASS INDEX (BMI) DOCUMENTED: ICD-10-PCS | Mod: CPTII,S$GLB,, | Performed by: INTERNAL MEDICINE

## 2021-01-13 PROCEDURE — 99214 PR OFFICE/OUTPT VISIT, EST, LEVL IV, 30-39 MIN: ICD-10-PCS | Mod: S$GLB,,, | Performed by: INTERNAL MEDICINE

## 2021-01-15 ENCOUNTER — CLINICAL SUPPORT (OUTPATIENT)
Dept: REHABILITATION | Facility: HOSPITAL | Age: 51
End: 2021-01-15
Payer: COMMERCIAL

## 2021-01-15 DIAGNOSIS — R29.898 DECREASED RANGE OF MOTION OF NECK: ICD-10-CM

## 2021-01-15 DIAGNOSIS — M62.81 MUSCLE WEAKNESS: ICD-10-CM

## 2021-01-15 DIAGNOSIS — M62.89 MUSCLE TIGHTNESS: ICD-10-CM

## 2021-01-15 DIAGNOSIS — M53.86 DECREASED RANGE OF MOTION OF LUMBAR SPINE: ICD-10-CM

## 2021-01-15 DIAGNOSIS — R29.3 POSTURE IMBALANCE: ICD-10-CM

## 2021-01-15 PROCEDURE — 97140 MANUAL THERAPY 1/> REGIONS: CPT | Mod: PN,CQ

## 2021-01-15 PROCEDURE — 97110 THERAPEUTIC EXERCISES: CPT | Mod: PN,CQ

## 2021-01-18 ENCOUNTER — PATIENT MESSAGE (OUTPATIENT)
Dept: ENDOSCOPY | Facility: HOSPITAL | Age: 51
End: 2021-01-18

## 2021-01-19 ENCOUNTER — LAB VISIT (OUTPATIENT)
Dept: FAMILY MEDICINE | Facility: CLINIC | Age: 51
End: 2021-01-19
Payer: COMMERCIAL

## 2021-01-19 DIAGNOSIS — Z01.818 PREOP TESTING: ICD-10-CM

## 2021-01-19 PROCEDURE — U0003 INFECTIOUS AGENT DETECTION BY NUCLEIC ACID (DNA OR RNA); SEVERE ACUTE RESPIRATORY SYNDROME CORONAVIRUS 2 (SARS-COV-2) (CORONAVIRUS DISEASE [COVID-19]), AMPLIFIED PROBE TECHNIQUE, MAKING USE OF HIGH THROUGHPUT TECHNOLOGIES AS DESCRIBED BY CMS-2020-01-R: HCPCS

## 2021-01-20 LAB — SARS-COV-2 RNA RESP QL NAA+PROBE: NOT DETECTED

## 2021-01-21 ENCOUNTER — HOSPITAL ENCOUNTER (OUTPATIENT)
Dept: RADIOLOGY | Facility: HOSPITAL | Age: 51
Discharge: HOME | End: 2021-01-21
Attending: INTERNAL MEDICINE
Payer: COMMERCIAL

## 2021-01-21 DIAGNOSIS — R19.4 CHANGE IN BOWEL HABITS: ICD-10-CM

## 2021-01-21 DIAGNOSIS — Z01.818 PREOP TESTING: ICD-10-CM

## 2021-01-21 DIAGNOSIS — R10.9 ABDOMINAL PAIN, UNSPECIFIED ABDOMINAL LOCATION: ICD-10-CM

## 2021-01-21 DIAGNOSIS — K21.9 GASTROESOPHAGEAL REFLUX DISEASE, UNSPECIFIED WHETHER ESOPHAGITIS PRESENT: ICD-10-CM

## 2021-01-21 DIAGNOSIS — K59.00 CONSTIPATION, UNSPECIFIED CONSTIPATION TYPE: ICD-10-CM

## 2021-01-21 DIAGNOSIS — R10.11 RUQ PAIN: ICD-10-CM

## 2021-01-21 PROCEDURE — 76700 US EXAM ABDOM COMPLETE: CPT | Mod: TC,PO

## 2021-01-21 PROCEDURE — 76700 US ABDOMEN COMPLETE: ICD-10-PCS | Mod: 26,,, | Performed by: RADIOLOGY

## 2021-01-21 PROCEDURE — 76700 US EXAM ABDOM COMPLETE: CPT | Mod: 26,,, | Performed by: RADIOLOGY

## 2021-01-26 ENCOUNTER — CLINICAL SUPPORT (OUTPATIENT)
Dept: REHABILITATION | Facility: HOSPITAL | Age: 51
End: 2021-01-26
Payer: COMMERCIAL

## 2021-01-26 DIAGNOSIS — R29.3 POSTURE IMBALANCE: ICD-10-CM

## 2021-01-26 DIAGNOSIS — R29.898 DECREASED RANGE OF MOTION OF NECK: ICD-10-CM

## 2021-01-26 DIAGNOSIS — M62.81 MUSCLE WEAKNESS: ICD-10-CM

## 2021-01-26 DIAGNOSIS — M53.86 DECREASED RANGE OF MOTION OF LUMBAR SPINE: ICD-10-CM

## 2021-01-26 DIAGNOSIS — M62.89 MUSCLE TIGHTNESS: ICD-10-CM

## 2021-01-26 PROCEDURE — 97140 MANUAL THERAPY 1/> REGIONS: CPT | Mod: PN

## 2021-01-26 PROCEDURE — 97110 THERAPEUTIC EXERCISES: CPT | Mod: PN

## 2021-01-27 ENCOUNTER — PATIENT MESSAGE (OUTPATIENT)
Dept: SPINE | Facility: CLINIC | Age: 51
End: 2021-01-27

## 2021-01-27 ENCOUNTER — PATIENT MESSAGE (OUTPATIENT)
Dept: FAMILY MEDICINE | Facility: CLINIC | Age: 51
End: 2021-01-27

## 2021-01-27 ENCOUNTER — TELEPHONE (OUTPATIENT)
Dept: SPINE | Facility: CLINIC | Age: 51
End: 2021-01-27

## 2021-01-28 ENCOUNTER — OFFICE VISIT (OUTPATIENT)
Dept: SPINE | Facility: CLINIC | Age: 51
End: 2021-01-28
Payer: COMMERCIAL

## 2021-01-28 ENCOUNTER — CLINICAL SUPPORT (OUTPATIENT)
Dept: REHABILITATION | Facility: HOSPITAL | Age: 51
End: 2021-01-28
Payer: COMMERCIAL

## 2021-01-28 VITALS
HEIGHT: 62 IN | BODY MASS INDEX: 33.26 KG/M2 | SYSTOLIC BLOOD PRESSURE: 132 MMHG | WEIGHT: 180.75 LBS | HEART RATE: 59 BPM | DIASTOLIC BLOOD PRESSURE: 81 MMHG

## 2021-01-28 DIAGNOSIS — Z98.1 HISTORY OF FUSION OF CERVICAL SPINE: ICD-10-CM

## 2021-01-28 DIAGNOSIS — R29.898 DECREASED RANGE OF MOTION OF NECK: ICD-10-CM

## 2021-01-28 DIAGNOSIS — M62.81 MUSCLE WEAKNESS: ICD-10-CM

## 2021-01-28 DIAGNOSIS — M54.12 CERVICAL RADICULOPATHY: Primary | ICD-10-CM

## 2021-01-28 DIAGNOSIS — R29.3 POSTURE IMBALANCE: ICD-10-CM

## 2021-01-28 DIAGNOSIS — M62.89 MUSCLE TIGHTNESS: ICD-10-CM

## 2021-01-28 DIAGNOSIS — M53.86 DECREASED RANGE OF MOTION OF LUMBAR SPINE: ICD-10-CM

## 2021-01-28 PROCEDURE — 99213 PR OFFICE/OUTPT VISIT, EST, LEVL III, 20-29 MIN: ICD-10-PCS | Mod: S$GLB,,, | Performed by: PHYSICIAN ASSISTANT

## 2021-01-28 PROCEDURE — 1125F PR PAIN SEVERITY QUANTIFIED, PAIN PRESENT: ICD-10-PCS | Mod: S$GLB,,, | Performed by: PHYSICIAN ASSISTANT

## 2021-01-28 PROCEDURE — 99213 OFFICE O/P EST LOW 20 MIN: CPT | Mod: S$GLB,,, | Performed by: PHYSICIAN ASSISTANT

## 2021-01-28 PROCEDURE — 99999 PR PBB SHADOW E&M-EST. PATIENT-LVL III: CPT | Mod: PBBFAC,,, | Performed by: PHYSICIAN ASSISTANT

## 2021-01-28 PROCEDURE — 3008F PR BODY MASS INDEX (BMI) DOCUMENTED: ICD-10-PCS | Mod: CPTII,S$GLB,, | Performed by: PHYSICIAN ASSISTANT

## 2021-01-28 PROCEDURE — 1125F AMNT PAIN NOTED PAIN PRSNT: CPT | Mod: S$GLB,,, | Performed by: PHYSICIAN ASSISTANT

## 2021-01-28 PROCEDURE — 3008F BODY MASS INDEX DOCD: CPT | Mod: CPTII,S$GLB,, | Performed by: PHYSICIAN ASSISTANT

## 2021-01-28 PROCEDURE — 97110 THERAPEUTIC EXERCISES: CPT | Mod: PN

## 2021-01-28 PROCEDURE — 99999 PR PBB SHADOW E&M-EST. PATIENT-LVL III: ICD-10-PCS | Mod: PBBFAC,,, | Performed by: PHYSICIAN ASSISTANT

## 2021-01-28 RX ORDER — METHYLPREDNISOLONE 4 MG/1
TABLET ORAL
Qty: 1 PACKAGE | Refills: 0 | Status: SHIPPED | OUTPATIENT
Start: 2021-01-28 | End: 2021-02-18

## 2021-03-02 ENCOUNTER — TELEPHONE (OUTPATIENT)
Dept: FAMILY MEDICINE | Facility: CLINIC | Age: 51
End: 2021-03-02

## 2021-03-02 RX ORDER — ROSUVASTATIN CALCIUM 10 MG/1
10 TABLET, COATED ORAL NIGHTLY
Qty: 90 TABLET | Refills: 1 | Status: CANCELLED | OUTPATIENT
Start: 2021-03-02

## 2021-03-02 RX ORDER — ROSUVASTATIN CALCIUM 10 MG/1
10 TABLET, COATED ORAL NIGHTLY
Qty: 90 TABLET | Refills: 1 | Status: SHIPPED | OUTPATIENT
Start: 2021-03-02

## 2021-03-16 ENCOUNTER — PATIENT MESSAGE (OUTPATIENT)
Dept: FAMILY MEDICINE | Facility: CLINIC | Age: 51
End: 2021-03-16

## 2021-03-24 ENCOUNTER — OFFICE VISIT (OUTPATIENT)
Dept: SPINE | Facility: CLINIC | Age: 51
End: 2021-03-24
Payer: MEDICAID

## 2021-03-24 VITALS
HEART RATE: 77 BPM | BODY MASS INDEX: 34.2 KG/M2 | SYSTOLIC BLOOD PRESSURE: 152 MMHG | WEIGHT: 185.88 LBS | HEIGHT: 62 IN | DIASTOLIC BLOOD PRESSURE: 96 MMHG

## 2021-03-24 DIAGNOSIS — Z98.1 HISTORY OF FUSION OF CERVICAL SPINE: ICD-10-CM

## 2021-03-24 DIAGNOSIS — M54.2 CHRONIC NECK PAIN: ICD-10-CM

## 2021-03-24 DIAGNOSIS — M54.12 CERVICAL RADICULOPATHY: Primary | ICD-10-CM

## 2021-03-24 DIAGNOSIS — M54.42 CHRONIC BILATERAL LOW BACK PAIN WITH LEFT-SIDED SCIATICA: ICD-10-CM

## 2021-03-24 DIAGNOSIS — G89.29 CHRONIC NECK PAIN: ICD-10-CM

## 2021-03-24 DIAGNOSIS — G89.29 CHRONIC BILATERAL LOW BACK PAIN WITH LEFT-SIDED SCIATICA: ICD-10-CM

## 2021-03-24 PROCEDURE — 99999 PR PBB SHADOW E&M-EST. PATIENT-LVL III: CPT | Mod: PBBFAC,,, | Performed by: PHYSICIAN ASSISTANT

## 2021-03-24 PROCEDURE — 99213 OFFICE O/P EST LOW 20 MIN: CPT | Mod: S$PBB,,, | Performed by: PHYSICIAN ASSISTANT

## 2021-03-24 PROCEDURE — 99213 PR OFFICE/OUTPT VISIT, EST, LEVL III, 20-29 MIN: ICD-10-PCS | Mod: S$PBB,,, | Performed by: PHYSICIAN ASSISTANT

## 2021-03-24 PROCEDURE — 99213 OFFICE O/P EST LOW 20 MIN: CPT | Mod: PBBFAC,PN | Performed by: PHYSICIAN ASSISTANT

## 2021-03-24 PROCEDURE — 99999 PR PBB SHADOW E&M-EST. PATIENT-LVL III: ICD-10-PCS | Mod: PBBFAC,,, | Performed by: PHYSICIAN ASSISTANT

## 2021-03-24 RX ORDER — CYCLOBENZAPRINE HCL 5 MG
5 TABLET ORAL EVERY 12 HOURS PRN
Qty: 40 TABLET | Refills: 0 | Status: SHIPPED | OUTPATIENT
Start: 2021-03-24

## 2022-02-28 ENCOUNTER — PATIENT MESSAGE (OUTPATIENT)
Dept: ADMINISTRATIVE | Facility: HOSPITAL | Age: 52
End: 2022-02-28
Payer: MEDICAID

## 2022-05-31 ENCOUNTER — PATIENT MESSAGE (OUTPATIENT)
Dept: ADMINISTRATIVE | Facility: HOSPITAL | Age: 52
End: 2022-05-31
Payer: MEDICAID